# Patient Record
Sex: MALE | Race: WHITE | NOT HISPANIC OR LATINO | Employment: FULL TIME | URBAN - NONMETROPOLITAN AREA
[De-identification: names, ages, dates, MRNs, and addresses within clinical notes are randomized per-mention and may not be internally consistent; named-entity substitution may affect disease eponyms.]

---

## 2017-01-26 ENCOUNTER — HISTORY (OUTPATIENT)
Dept: FAMILY MEDICINE | Facility: OTHER | Age: 42
End: 2017-01-26

## 2017-01-26 ENCOUNTER — OFFICE VISIT - GICH (OUTPATIENT)
Dept: FAMILY MEDICINE | Facility: OTHER | Age: 42
End: 2017-01-26

## 2017-01-26 DIAGNOSIS — I10 ESSENTIAL (PRIMARY) HYPERTENSION: ICD-10-CM

## 2017-01-26 LAB
ANION GAP - HISTORICAL: 12 (ref 5–18)
BUN SERPL-MCNC: 21 MG/DL (ref 7–25)
BUN/CREAT RATIO - HISTORICAL: 19
CALCIUM SERPL-MCNC: 10 MG/DL (ref 8.6–10.3)
CHLORIDE SERPLBLD-SCNC: 99 MMOL/L (ref 98–107)
CHOL/HDL RATIO - HISTORICAL: 3.52
CHOLESTEROL TOTAL: 176 MG/DL
CO2 SERPL-SCNC: 26 MMOL/L (ref 21–31)
CREAT SERPL-MCNC: 1.13 MG/DL (ref 0.7–1.3)
GFR IF NOT AFRICAN AMERICAN - HISTORICAL: >60 ML/MIN/1.73M2
GLUCOSE SERPL-MCNC: 92 MG/DL (ref 70–105)
HDLC SERPL-MCNC: 50 MG/DL (ref 23–92)
LDLC SERPL CALC-MCNC: 111 MG/DL
NON-HDL CHOLESTEROL - HISTORICAL: 126 MG/DL
PATIENT STATUS - HISTORICAL: ABNORMAL
POTASSIUM SERPL-SCNC: 4.1 MMOL/L (ref 3.5–5.1)
SODIUM SERPL-SCNC: 137 MMOL/L (ref 133–143)
TRIGL SERPL-MCNC: 77 MG/DL

## 2017-11-20 ENCOUNTER — COMMUNICATION - GICH (OUTPATIENT)
Dept: FAMILY MEDICINE | Facility: OTHER | Age: 42
End: 2017-11-20

## 2017-11-20 DIAGNOSIS — I10 ESSENTIAL (PRIMARY) HYPERTENSION: ICD-10-CM

## 2017-12-28 NOTE — TELEPHONE ENCOUNTER
Patient Information     Patient Name MRN Sex Gabriel Garg [id number] Male 1975      Telephone Encounter by Lor Glover RN at 2017  3:32 PM     Author:  Lor Glover RN Service:  (none) Author Type:  NURS- Registered Nurse     Filed:  2017  3:38 PM Encounter Date:  2017 Status:  Signed     :  Lor Glover RN (NURS- Registered Nurse)            Redundant Refill Request refused:    Medication:hydroCHLOROthiazide (HCTZ) 25 mg tablet    Qty:90 tablet   Ref:3  Start:2017   End:              Route:Oral                  POLO:No   Class:eRx    Sig:Take 1 tablet by mouth once daily.    Pharmacy:[facility] [address] [place] [address]    Medication:lisinopril (PRINIVIL; ZESTRIL) 10 mg tablet    Qty:90 tablet   Ref:3  Start:2017   End:              Route:Oral                  POLO:No   Class:eRx    Sig:Take 1 tablet by mouth once daily.    Pharmacy:[facility] [address] [place] [address]    Unable to complete prescription refill per RN Medication Refill Policy.................... Lor Glover RN ....................  2017   3:35 PM

## 2017-12-29 ENCOUNTER — OFFICE VISIT - GICH (OUTPATIENT)
Dept: FAMILY MEDICINE | Facility: OTHER | Age: 42
End: 2017-12-29

## 2017-12-29 ENCOUNTER — HISTORY (OUTPATIENT)
Dept: FAMILY MEDICINE | Facility: OTHER | Age: 42
End: 2017-12-29

## 2017-12-29 DIAGNOSIS — Z00.00 ENCOUNTER FOR GENERAL ADULT MEDICAL EXAMINATION WITHOUT ABNORMAL FINDINGS: ICD-10-CM

## 2017-12-29 DIAGNOSIS — I10 ESSENTIAL (PRIMARY) HYPERTENSION: ICD-10-CM

## 2017-12-29 LAB
ANION GAP - HISTORICAL: 6 (ref 5–18)
BUN SERPL-MCNC: 15 MG/DL (ref 7–25)
BUN/CREAT RATIO - HISTORICAL: 15
CALCIUM SERPL-MCNC: 10 MG/DL (ref 8.6–10.3)
CHLORIDE SERPLBLD-SCNC: 102 MMOL/L (ref 98–107)
CO2 SERPL-SCNC: 29 MMOL/L (ref 21–31)
CREAT SERPL-MCNC: 0.97 MG/DL (ref 0.7–1.3)
GFR IF NOT AFRICAN AMERICAN - HISTORICAL: >60 ML/MIN/1.73M2
GLUCOSE SERPL-MCNC: 96 MG/DL (ref 70–105)
POTASSIUM SERPL-SCNC: 3.9 MMOL/L (ref 3.5–5.1)
SODIUM SERPL-SCNC: 137 MMOL/L (ref 133–143)

## 2017-12-29 ASSESSMENT — PATIENT HEALTH QUESTIONNAIRE - PHQ9: SUM OF ALL RESPONSES TO PHQ QUESTIONS 1-9: 0

## 2017-12-31 ENCOUNTER — HISTORY (OUTPATIENT)
Dept: EMERGENCY MEDICINE | Facility: OTHER | Age: 42
End: 2017-12-31

## 2018-01-03 NOTE — PROGRESS NOTES
Patient Information     Patient Name MRN Sex Gabriel Garg [id number] Male 1975      Progress Notes by Giacomo Garcia MD at 2017  7:45 AM     Author:  Giacomo Garcia MD Service:  (none) Author Type:  Physician     Filed:  2017  8:53 AM Encounter Date:  2017 Status:  Signed     :  Giacomo Garcia MD (Physician)            SUBJECTIVE:    Gabriel Saez is a 41 y.o. male who presents for blood pressure follow-up    HPI Comments: Patient arrives here for blood pressure follow-up. States that his blood pressure went fairly low and he became slightly lightheaded. He did decrease his Catapres to 1 a day. Reports some improvement. Otherwise tolerating the medications well. Is in need of a chemistry panel.      No Known Allergies and   Family History       Problem   Relation Age of Onset     Other  Mother      h/o migraines       Hypertension  Father 63     Heart Disease        CAD         REVIEW OF SYSTEMS:  ROS    OBJECTIVE:  /70  Pulse 72  Wt 96.3 kg (212 lb 3.2 oz)  BMI 27.24 kg/m2    EXAM:   Physical Exam   Constitutional: He is well-developed, well-nourished, and in no distress.   Cardiovascular: Normal rate, regular rhythm and normal heart sounds.    Pulmonary/Chest: Effort normal and breath sounds normal.       ASSESSMENT/PLAN:    ICD-10-CM    1. HTN (hypertension) I10 BASIC METABOLIC PANEL      LIPID PANEL      BASIC METABOLIC PANEL      LIPID PANEL        Plan:  Hypertension currently under good control. Recommended discontinuing his Catapres. Continue with the hydrochlorothiazide and Zestril. We'll get back to patient when his labs return.

## 2018-01-03 NOTE — NURSING NOTE
Patient Information     Patient Name MRN Sex Gabriel Garg [id number] Male 1975      Nursing Note by Maria Esther Soni at 2017  7:45 AM     Author:  Maria Esther Soni Service:  (none) Author Type:  (none)     Filed:  2017  8:11 AM Encounter Date:  2017 Status:  Signed     :  Maria Esther Soni            Patient here for follow up to blood pressure. At home range from 130 110 to 65, was too low when taking clonidine BID and he was super tired. Occasional headaches that go away with water. Maria Esther Soni LPN .......................2017  8:08 AM

## 2018-01-12 ENCOUNTER — HISTORY (OUTPATIENT)
Dept: FAMILY MEDICINE | Facility: OTHER | Age: 43
End: 2018-01-12

## 2018-01-12 ENCOUNTER — OFFICE VISIT - GICH (OUTPATIENT)
Dept: FAMILY MEDICINE | Facility: OTHER | Age: 43
End: 2018-01-12

## 2018-01-12 DIAGNOSIS — I10 ESSENTIAL (PRIMARY) HYPERTENSION: ICD-10-CM

## 2018-01-12 DIAGNOSIS — R07.89 OTHER CHEST PAIN: ICD-10-CM

## 2018-01-16 ENCOUNTER — HISTORY (OUTPATIENT)
Dept: INTERNAL MEDICINE | Facility: OTHER | Age: 43
End: 2018-01-16

## 2018-01-16 ENCOUNTER — OFFICE VISIT - GICH (OUTPATIENT)
Dept: INTERNAL MEDICINE | Facility: OTHER | Age: 43
End: 2018-01-16

## 2018-01-16 DIAGNOSIS — I10 ESSENTIAL (PRIMARY) HYPERTENSION: ICD-10-CM

## 2018-01-16 DIAGNOSIS — R07.89 OTHER CHEST PAIN: ICD-10-CM

## 2018-01-16 ASSESSMENT — PATIENT HEALTH QUESTIONNAIRE - PHQ9: SUM OF ALL RESPONSES TO PHQ QUESTIONS 1-9: 0

## 2018-01-27 VITALS — HEART RATE: 72 BPM | WEIGHT: 212.2 LBS | SYSTOLIC BLOOD PRESSURE: 110 MMHG | DIASTOLIC BLOOD PRESSURE: 70 MMHG

## 2018-02-01 ENCOUNTER — DOCUMENTATION ONLY (OUTPATIENT)
Dept: FAMILY MEDICINE | Facility: OTHER | Age: 43
End: 2018-02-01

## 2018-02-01 RX ORDER — LISINOPRIL 10 MG/1
10 TABLET ORAL DAILY
COMMUNITY
Start: 2017-12-29 | End: 2019-01-24

## 2018-02-01 RX ORDER — HYDROCHLOROTHIAZIDE 25 MG/1
25 TABLET ORAL DAILY
COMMUNITY
Start: 2017-12-29 | End: 2019-01-24

## 2018-02-07 ENCOUNTER — DOCUMENTATION ONLY (OUTPATIENT)
Dept: FAMILY MEDICINE | Facility: OTHER | Age: 43
End: 2018-02-07

## 2018-02-08 ENCOUNTER — COMMUNICATION - GICH (OUTPATIENT)
Dept: CARDIAC REHAB | Facility: OTHER | Age: 43
End: 2018-02-08

## 2018-02-09 VITALS
BODY MASS INDEX: 29.09 KG/M2 | WEIGHT: 219 LBS | HEART RATE: 80 BPM | SYSTOLIC BLOOD PRESSURE: 128 MMHG | DIASTOLIC BLOOD PRESSURE: 88 MMHG

## 2018-02-09 VITALS
HEART RATE: 76 BPM | DIASTOLIC BLOOD PRESSURE: 78 MMHG | BODY MASS INDEX: 28.93 KG/M2 | SYSTOLIC BLOOD PRESSURE: 110 MMHG | WEIGHT: 217.8 LBS

## 2018-02-09 VITALS
HEART RATE: 56 BPM | SYSTOLIC BLOOD PRESSURE: 112 MMHG | WEIGHT: 217.8 LBS | BODY MASS INDEX: 28.86 KG/M2 | DIASTOLIC BLOOD PRESSURE: 76 MMHG | HEIGHT: 73 IN

## 2018-02-10 ASSESSMENT — PATIENT HEALTH QUESTIONNAIRE - PHQ9
SUM OF ALL RESPONSES TO PHQ QUESTIONS 1-9: 0
SUM OF ALL RESPONSES TO PHQ QUESTIONS 1-9: 0

## 2018-02-12 ENCOUNTER — HOSPITAL ENCOUNTER (OUTPATIENT)
Dept: CARDIOLOGY | Facility: OTHER | Age: 43
Discharge: HOME | End: 2018-02-12
Attending: INTERNAL MEDICINE | Admitting: INTERNAL MEDICINE
Payer: COMMERCIAL

## 2018-02-12 ENCOUNTER — HOSPITAL ENCOUNTER (OUTPATIENT)
Dept: CARDIOLOGY | Facility: OTHER | Age: 43
End: 2018-02-12
Attending: INTERNAL MEDICINE
Payer: COMMERCIAL

## 2018-02-12 DIAGNOSIS — R07.89 OTHER CHEST PAIN: ICD-10-CM

## 2018-02-12 PROCEDURE — 93321 DOPPLER ECHO F-UP/LMTD STD: CPT | Mod: 26 | Performed by: INTERNAL MEDICINE

## 2018-02-12 PROCEDURE — 93018 CV STRESS TEST I&R ONLY: CPT | Performed by: INTERNAL MEDICINE

## 2018-02-12 PROCEDURE — 25500064 ZZH RX 255 OP 636: Performed by: INTERNAL MEDICINE

## 2018-02-12 PROCEDURE — 93325 DOPPLER ECHO COLOR FLOW MAPG: CPT | Mod: 26 | Performed by: INTERNAL MEDICINE

## 2018-02-12 PROCEDURE — 93017 CV STRESS TEST TRACING ONLY: CPT

## 2018-02-12 PROCEDURE — 93350 STRESS TTE ONLY: CPT | Mod: 26 | Performed by: INTERNAL MEDICINE

## 2018-02-12 PROCEDURE — 93350 STRESS TTE ONLY: CPT | Mod: TC

## 2018-02-12 PROCEDURE — 93016 CV STRESS TEST SUPVJ ONLY: CPT | Performed by: INTERNAL MEDICINE

## 2018-02-12 RX ADMIN — PERFLUTREN 3 ML: 6.52 INJECTION, SUSPENSION INTRAVENOUS at 15:36

## 2018-02-12 NOTE — PROGRESS NOTES
Definity requested by echo tech for image enhancement.  No contraindications to therapy.  Verbal consent obtained.  Definity prepped per procedure.  Definity lot #[id number] MAR.

## 2018-02-12 NOTE — PROGRESS NOTES
1400:The patient arrived for a stress echo.  The procedure, risks and benefits were discussed and the consent was signed.  The patient was prepped for the stress test, and the echo sonographer did the initial images with definity for image enhancement.    arrived, and the patient walked 11minutes and 31seconds.  The patient tolerated the procedure.  Stress images were completed and the patient was released in stable condition.  Please see the chart for complete test results.

## 2018-02-12 NOTE — NURSING NOTE
Patient Information     Patient Name MRN Sex Gabriel Garg [id number] Male 1975      Nursing Note by Maria Esther Soni at 2017  9:45 AM     Author:  Maria Esther Soni Service:  (none) Author Type:  (none)     Filed:  2017 10:02 AM Encounter Date:  2017 Status:  Signed     :  Maria Esther Soni            Patient here for blood pressure medication check, 130/80, 120/99 with home readings. He says in the last 2 months he feels off.  Maria Esther Soni LPN .......................2017  9:54 AM

## 2018-02-13 NOTE — TELEPHONE ENCOUNTER
Patient Information     Patient Name MRN Sex Gabriel Garg [id number] Male 1975      Telephone Encounter by Louisa Gomes RN at 2018  9:16 AM     Author:  Louisa Gomes RN Service:  (none) Author Type:  NURS- Registered Nurse     Filed:  2018  9:18 AM Encounter Date:  2018 Status:  Signed     :  Louisa Gomes RN (NURS- Registered Nurse)            Called to remind patient of stress test and review instructions. Aware of no caffeine for 12 hours, no food for 4 hours before, and to check in at diagnostics. No other questions.

## 2018-02-13 NOTE — NURSING NOTE
Patient Information     Patient Name MRN Sex Gabriel Garg [id number] Male 1975      Nursing Note by Maria Esther Soni at 2018  1:45 PM     Author:  Maria Esther Soni Service:  (none) Author Type:  (none)     Filed:  2018  1:56 PM Encounter Date:  2018 Status:  Signed     :  Maria Esther Soni            Patient here for ED follow up form 17 for chest pain.He says the numbness has dissapated. Maria Esther Soni LPN .......................2018  1:54 PM

## 2018-02-13 NOTE — PROGRESS NOTES
"Patient Information     Patient Name MRN Sex Gabriel Garg [id number] Male 1975      Progress Notes by Giacomo Garcia MD at 2017  9:45 AM     Author:  Giacomo Garcia MD Service:  (none) Author Type:  Physician     Filed:  1/3/2018  8:40 AM Encounter Date:  2017 Status:  Signed     :  Giacomo Garcia MD (Physician)            SUBJECTIVE:    Gabriel Saez is a 42 y.o. male who presents for follow-up blood pressure medication immunization update    HPI Comments: Patient arrives here for follow-up blood pressure medication. Immunization update. His blood pressure hasn't been under good control. He's tolerating it fairly well although states that he feels a little off at times. No specific complaints though.      No Known Allergies,   Family History       Problem   Relation Age of Onset     Other  Mother      h/o migraines       Hypertension  Father 63     Heart Disease        CAD     ,   No current outpatient prescriptions on file prior to visit.     No current facility-administered medications on file prior to visit.    ,   Social History       Substance Use Topics         Smoking status:   Never Smoker     Smokeless tobacco:   Never Used     Alcohol use   3.6 oz/week     6 Cans of beer, 0 Standard drinks or equivalent per week        Comment: 6 days a week     and   Social History       Substance Use Topics         Smoking status:   Never Smoker     Smokeless tobacco:   Never Used     Alcohol use   3.6 oz/week     6 Cans of beer, 0 Standard drinks or equivalent per week        Comment: 6 days a week        REVIEW OF SYSTEMS:  ROS    OBJECTIVE:  /76 (Cuff Site: Right Arm, Position: Sitting, Cuff Size: Adult Large)  Pulse 56  Ht 1.848 m (6' 0.75\")  Wt 98.8 kg (217 lb 12.8 oz)  BMI 28.93 kg/m2    EXAM:   Physical Exam   Constitutional: He is well-developed, well-nourished, and in no distress.   Cardiovascular: Normal rate, regular rhythm and normal heart sounds.  "     Results for orders placed or performed in visit on 12/29/17       BASIC METABOLIC PANEL       Result  Value Ref Range Status    SODIUM 137 133 - 143 mmol/L Final    POTASSIUM 3.9 3.5 - 5.1 mmol/L Final    CHLORIDE 102 98 - 107 mmol/L Final    CO2,TOTAL 29 21 - 31 mmol/L Final    ANION GAP 6 5 - 18                 Final    GLUCOSE 96 70 - 105 mg/dL Final    CALCIUM 10.0 8.6 - 10.3 mg/dL Final    BUN 15 7 - 25 mg/dL Final    CREATININE 0.97 0.70 - 1.30 mg/dL Final    BUN/CREAT RATIO           15                 Final    GFR if African American >60 >60 ml/min/1.73m2 Final    GFR if not African American >60 >60 ml/min/1.73m2 Final         ASSESSMENT/PLAN:    ICD-10-CM    1. HTN (hypertension) I10 lisinopril (PRINIVIL; ZESTRIL) 10 mg tablet      hydroCHLOROthiazide (HCTZ) 25 mg tablet      BASIC METABOLIC PANEL      FLU VACCINE => 3 YRS PF QUADRIVALENT IIV4 IM      BASIC METABOLIC PANEL   2. Preventative health care Z00.00 SC ADMIN VACC INITIAL        Plan:  Blood pressure currently under good control. Continue with the current medications. Immunizations updated.

## 2018-02-13 NOTE — PROGRESS NOTES
Patient Information     Patient Name MRN Sex Gabriel Garg [id number] Male 1975      Progress Notes by Giacomo Garcia MD at 2018  1:45 PM     Author:  Giacomo Garcia MD Service:  (none) Author Type:  Physician     Filed:  1/15/2018 12:43 PM Encounter Date:  2018 Status:  Signed     :  Giacomo Garcia MD (Physician)            SUBJECTIVE:    Gabriel Saez is a 42 y.o. male who presents for follow-up chest pain    HPI Comments: Patient arrives here for follow-up chest pain. Was recently seen in the ER and . He did rule out for a acute coronary syndrome but was advised to follow-up for a stress test. He states that the chest pain seemed to be associated with some numbness of his face and left arm. Increasing blood pressure. He's been under a lot of pressure lately with a job change. Pain not associated with activity. Patient does have a history of hypertension.      No Known Allergies,   Family History       Problem   Relation Age of Onset     Other  Mother      h/o migraines       Hypertension  Father 63     Heart Disease        CAD     ,   Current Outpatient Prescriptions on File Prior to Visit       Medication  Sig Dispense Refill     hydroCHLOROthiazide (HCTZ) 25 mg tablet Take 1 tablet by mouth once daily. 90 tablet 4     lisinopril (PRINIVIL; ZESTRIL) 10 mg tablet Take 1 tablet by mouth once daily. 90 tablet 4     No current facility-administered medications on file prior to visit.    , No past surgical history on file.,   Social History       Substance Use Topics         Smoking status:   Never Smoker     Smokeless tobacco:   Never Used     Alcohol use   3.6 oz/week     6 Cans of beer, 0 Standard drinks or equivalent per week        Comment: 6 days a week     and   Social History       Substance Use Topics         Smoking status:   Never Smoker     Smokeless tobacco:   Never Used     Alcohol use   3.6 oz/week     6 Cans of beer, 0 Standard drinks or  equivalent per week        Comment: 6 days a week        REVIEW OF SYSTEMS:  Review of Systems   Cardiovascular: Positive for chest pain.   Neurological: Positive for sensory change. Negative for dizziness, speech change, focal weakness and seizures.       OBJECTIVE:  /78 (Cuff Site: Right Arm, Position: Sitting, Cuff Size: Adult Large)  Pulse 76  Wt 98.8 kg (217 lb 12.8 oz)  BMI 27.96 kg/m2    EXAM:   Physical Exam   Constitutional: He is well-developed, well-nourished, and in no distress.   HENT:   Right Ear: External ear normal.   Left Ear: External ear normal.   Cardiovascular: Normal rate, regular rhythm and normal heart sounds.    No murmur heard.  Pulmonary/Chest: Effort normal and breath sounds normal.   Neurological: He is alert.   Psychiatric: Affect normal.       ASSESSMENT/PLAN:    ICD-10-CM    1. Other chest pain R07.89 AMB CONSULT TO INTERNAL MEDICINE   2. HTN (hypertension) I10         Plan:  Recent ER visit with chest pain. Not entirely consistent with cardiac in origin. Per ER recommendation patient would like an internal medicine consult to discuss possibly further evaluation.

## 2018-02-13 NOTE — PROGRESS NOTES
Patient Information     Patient Name MRN Sex Gabriel Garg [id number] Male 1975      Progress Notes by Jason Cabezas MD at 2018  3:40 PM     Author:  Jason Cabezas MD Service:  (none) Author Type:  Physician     Filed:  2018  4:17 PM Encounter Date:  2018 Status:  Signed     :  Jason Cabezas MD (Physician)            SUBJECTIVE:    Gabriel Saez is a 42 y.o. male who presents for consultation regarding chest pain.    HPI Comments: This patient is here for consultation at the request of Dr. Garcia. He has had an episode of chest pain that sent him to the emergency room. This happened on New Year's lore. He was working on making some chili and was going to have a beer when he just did not feel well. He just felt a strange sensation in his chest and abdomen. This slowly progressed to the point where he was having fairly significant left-sided chest pain with some numbness and even discomfort in his jaw and the side of his face. He was not particularly short of breath and there was no other radiation of the pain. He was not nauseated but just did not feel well or feel right. He did not have a fever. The discomfort persisted and was fairly significant so he ultimately went to the emergency room. He describes the pain as a heaviness or tightness throughout the chest. The pain was present for probably a couple of hours before he went to the emergency room. He had a complete evaluation there including negative Serial troponins and negative EKG. His lab was reviewed and was negative. He had a CT for pulmonary embolus, this was negative and normal as well. That was reviewed today also. He has not had any further episodes of pain like this but he is concerned regarding possible heart disease as a cause for this episode.    Cardiac risks are positive for hypertension. He does not smoke and is not obese but is slightly overweight. Recent cholesterol was acceptable. He does not have  diabetes. Family history is negative for premature atherosclerosis.    He admits that he has been under some stress at his job and suggested maybe this had something to do with the episode of pain. Medications are reconciled. Past medical history, past surgical history, family history and social histories reviewed and updated.      No Known Allergies,   Current Outpatient Prescriptions     Medication  Sig     hydroCHLOROthiazide (HCTZ) 25 mg tablet Take 1 tablet by mouth once daily.     lisinopril (PRINIVIL; ZESTRIL) 10 mg tablet Take 1 tablet by mouth once daily.     No current facility-administered medications for this visit.      Medications have been reviewed by me and are current to the best of my knowledge and ability. ,   Past Medical History:     Diagnosis  Date     Degloving injury of finger     left thumb      Femur fracture, right (HC) age 21     Fracture of right hip (HC) age 21     Hypertension      Tibia/fibula fracture age 3   ,   Patient Active Problem List      Diagnosis Date Noted     Other chest pain 01/12/2018     HTN (hypertension) 06/25/2015     OVERWEIGHT 01/04/2011   ,   Past Surgical History:      Procedure  Laterality Date     ORTHOPEDIC SURGERY Right     Femur zaira and hip pinning      and   Social History       Substance Use Topics         Smoking status:   Never Smoker     Smokeless tobacco:   Never Used     Alcohol use   3.6 oz/week      6 Cans of beer, 0 Standard drinks or equivalent per week         Comment: 6 days a week      Family Status     Relation  Status     Mother Alive     Father Alive     Other      Brother Alive     Brother Alive     Social History     Social History        Marital status:       Spouse name: N/A     Number of children:  N/A     Years of education:  N/A     Social History Main Topics         Smoking status:   Never Smoker     Smokeless tobacco:   Never Used     Alcohol use   3.6 oz/week     6 Cans of beer, 0 Standard drinks or equivalent per week         Comment: 6 days a week      Drug use:   No     Sexual activity:   Not Asked     Other Topics  Concern     None      Social History Narrative     , 2 children, works at [employer].  Lives in town.           REVIEW OF SYSTEMS:  Review of Systems   All other systems reviewed and are negative.      OBJECTIVE:  /88 (Cuff Site: Right Arm, Position: Sitting, Cuff Size: Adult Regular)  Pulse 80  Wt 99.3 kg (219 lb)  BMI 28.12 kg/m2    EXAM:   Physical Exam   Constitutional: He is well-developed, well-nourished, and in no distress. No distress.   Eyes: Pupils are equal, round, and reactive to light.   Neck: Normal range of motion. Neck supple. Normal carotid pulses and no JVD present. Carotid bruit is not present. No tracheal deviation present. No thyromegaly present.   Cardiovascular: Normal rate, regular rhythm and normal heart sounds.  Exam reveals no gallop and no friction rub.    No murmur heard.  Pulmonary/Chest: Effort normal and breath sounds normal. No respiratory distress. He has no wheezes. He has no rales.   Abdominal: Soft. Bowel sounds are normal. He exhibits no distension and no mass. There is no tenderness. There is no rebound and no guarding.   Musculoskeletal: He exhibits no edema.   Lymphadenopathy:     He has no cervical adenopathy.   Neurological: He is alert.   Skin: Skin is warm and dry. He is not diaphoretic.   Psychiatric: Affect normal.   Nursing note and vitals reviewed.      ASSESSMENT/PLAN:    ICD-10-CM    1. HTN (hypertension) I10    2. Other chest pain R07.89 AMB CONSULT TO INTERNAL MEDICINE      STRESS TEST EXERCISE DONE WITH STRESS ECHO      ECHO STRESS W CONTRAST        Plan:  I spent some time today reviewing his studies with him including his CT scan of the chest for pulmonary embolism. This was entirely normal including no non-PE related disease. His chest pain syndrome seems atypical for occult coronary artery disease but given his age and his history of  hypertension, it seems reasonable to rule this out to complete the evaluation. This was discussed with the patient and he is in favor of doing stress testing. I will get him scheduled for a stress echo in the near future for further evaluation. I think that if the stress test is negative, no further evaluation would be necessary unless he has recurrent problems.

## 2018-02-14 RX ADMIN — PERFLUTREN 3 ML: 6.52 INJECTION, SUSPENSION INTRAVENOUS at 11:48

## 2018-02-15 NOTE — ADDENDUM NOTE
Encounter addended by: Betty Valle on: 2/15/2018  8:50 AM<BR>     Actions taken: Charge Capture section accepted

## 2018-07-23 NOTE — PROGRESS NOTES
Patient Information     Patient Name  Gabriel Saez MRN  [id number] Sex  Male   1975      Letter by Giacomo Garcia MD at      Author:  Giacomo Garcia MD Service:  (none) Author Type:  (none)    Filed:   Encounter Date:  2017 Status:  (Other)           Gabriel Saez  [address] [facility], [facility] [address]          2017    Dear Mr. Saez:    Enclosed is a copy of your cholesterol and kidney function indicating good cholesterol control. I would recommend continuing the medications as prescribed. Please call if you should have any questions.  Results for orders placed or performed in visit on 17       BASIC METABOLIC PANEL       Result  Value Ref Range Status    SODIUM 137 133 - 143 mmol/L Final    POTASSIUM 4.1 3.5 - 5.1 mmol/L Final    CHLORIDE 99 98 - 107 mmol/L Final    CO2,TOTAL 26 21 - 31 mmol/L Final    ANION GAP 12 5 - 18                 Final    GLUCOSE 92 70 - 105 mg/dL Final    CALCIUM 10.0 8.6 - 10.3 mg/dL Final    BUN 21 7 - 25 mg/dL Final    CREATININE 1.13 0.70 - 1.30 mg/dL Final    BUN/CREAT RATIO           19                 Final    GFR if African American >60 >60 ml/min/1.73m2 Final    GFR if not African American >60 >60 ml/min/1.73m2 Final   LIPID PANEL       Result  Value Ref Range Status    CHOLESTEROL,TOTAL 176 <200 mg/dL Final    TRIGLYCERIDES 77 <150 mg/dL Final    HDL CHOLESTEROL 50 23 - 92 mg/dL Final    NON-HDL CHOLESTEROL 126 <145 mg/dl Final    CHOL/HDL RATIO            3.52 <4.50                 Final    LDL CHOLESTEROL 111 (H) <100 mg/dL Final    PATIENT STATUS            NOT GIVEN                 Final     Giacomo Garcia MD ....................  2017   11:08 AM

## 2018-07-23 NOTE — PROGRESS NOTES
Patient Information     Patient Name  Gabriel Saez MRN  [id number] Sex  Male   1975      Letter by Giacomo Garcia MD at      Author:  Giacomo Garcia MD Service:  (none) Author Type:  (none)    Filed:   Encounter Date:  2017 Status:  (Other)           Gabriel Saez  [address] [facility] [address]          January 3, 2018    Dear Mr. Saez:    Enclosed are copies of your laboratory testing which did come back satisfactory. Please call if you should have any questions.  Results for orders placed or performed in visit on 17       BASIC METABOLIC PANEL       Result  Value Ref Range Status    SODIUM 137 133 - 143 mmol/L Final    POTASSIUM 3.9 3.5 - 5.1 mmol/L Final    CHLORIDE 102 98 - 107 mmol/L Final    CO2,TOTAL 29 21 - 31 mmol/L Final    ANION GAP 6 5 - 18                 Final    GLUCOSE 96 70 - 105 mg/dL Final    CALCIUM 10.0 8.6 - 10.3 mg/dL Final    BUN 15 7 - 25 mg/dL Final    CREATININE 0.97 0.70 - 1.30 mg/dL Final    BUN/CREAT RATIO           15                 Final    GFR if African American >60 >60 ml/min/1.73m2 Final    GFR if not African American >60 >60 ml/min/1.73m2 Final     Giacomo Garcia MD ....................  1/3/2018   1:40 PM

## 2018-08-31 ENCOUNTER — OFFICE VISIT (OUTPATIENT)
Dept: FAMILY MEDICINE | Facility: OTHER | Age: 43
End: 2018-08-31
Attending: NURSE PRACTITIONER
Payer: COMMERCIAL

## 2018-08-31 ENCOUNTER — TELEPHONE (OUTPATIENT)
Dept: FAMILY MEDICINE | Facility: OTHER | Age: 43
End: 2018-08-31

## 2018-08-31 VITALS
DIASTOLIC BLOOD PRESSURE: 88 MMHG | WEIGHT: 218.13 LBS | TEMPERATURE: 97.2 F | HEART RATE: 88 BPM | BODY MASS INDEX: 28.98 KG/M2 | SYSTOLIC BLOOD PRESSURE: 122 MMHG

## 2018-08-31 DIAGNOSIS — R10.32 LLQ ABDOMINAL PAIN: Primary | ICD-10-CM

## 2018-08-31 PROCEDURE — 99213 OFFICE O/P EST LOW 20 MIN: CPT | Performed by: NURSE PRACTITIONER

## 2018-08-31 ASSESSMENT — PAIN SCALES - GENERAL: PAINLEVEL: NO PAIN (1)

## 2018-08-31 NOTE — PROGRESS NOTES
HPI:    Gabriel Saez is a 42 year old male who presents to clinic today for abdominal pain. He is having left lower quadrant pain. States is has been going on for the past 3 years. When he has the pain it is sharp and rates 2-3/10. When he has pain drinking water and deep breaths make this better. Most foods don't make this worse. Spicy foods seem to make this worse. Increased alcohol use will worsen this. Reports episodes of pain as often as every month to 3 moths or more. No constipation. Will have some diarrhea at times and then the sx resolve. He has had labs in the past, nothing significant. He reports had cardiac work up and negative. Has tried some OTC acid reducers with some mild relief of sx.     Past Medical History:   Diagnosis Date     Closed fracture of neck of right femur (H)     age 21     Closed fracture of right femur (H)     age 21     Closed fracture of shaft of fibula     age 3     Essential (primary) hypertension     No Comments Provided     Open wound of finger without damage to nail     left thumb       Social History     Social History     Marital status:      Spouse name: N/A     Number of children: N/A     Years of education: N/A     Occupational History     Not on file.     Social History Main Topics     Smoking status: Never Smoker     Smokeless tobacco: Never Used     Alcohol use 3.6 oz/week      Comment: Alcoholic Drinks/day: 6 days a week     Drug use: No      Comment: Drug use: No     Sexual activity: Not on file     Other Topics Concern     Not on file     Social History Narrative    , 2 children, works at [employer].  Lives in town.       Current Outpatient Prescriptions   Medication Sig Dispense Refill     hydrochlorothiazide (HYDRODIURIL) 25 MG tablet Take 25 mg by mouth daily       lisinopril (PRINIVIL/ZESTRIL) 10 MG tablet Take 10 mg by mouth daily         No Known Allergies    ROS:  Pertinent positives and negatives are noted in HPI.    EXAM:  General  appearance: well appearing male, in no acute distress  Respiratory: clear to auscultation bilaterally  Cardiac: RRR with no murmurs  Abdomen: soft, nontender, no masses or organomegally, normal BS  Psychological: normal affect, alert and pleasant    ASSESSMENT AND PLAN:    1. LLQ abdominal pain      Nonspecific LLQ abdominal pain that has been intermittent for a long time. No current sx, exam is stable. Discussed with PCP. Will order hemoccults and f/u with results as needed. Discussed sx that would warrant f/u such has bloody stools, fevers or worsening pain. F/u prn.        Yee Schmid..................8/31/2018 2:47 PM

## 2018-08-31 NOTE — TELEPHONE ENCOUNTER
Left message to come early 12:45 to appt. Maria Esther Soni LPN .......................8/31/2018  9:18 AM

## 2018-08-31 NOTE — MR AVS SNAPSHOT
"              After Visit Summary   2018    Gabriel Saez    MRN: [id number]           Patient Information     Date Of Birth          1975        Visit Information        Provider Department      2018 3:00 PM Yee Schmid APRN CNP [facility]        Today's Diagnoses     LLQ abdominal pain    -  1       Follow-ups after your visit        Who to contact     If you have questions or need follow up information about today's clinic visit or your schedule please contact [facility] directly at [phone].  Normal or non-critical lab and imaging results will be communicated to you by Stakeforce[employer], letter or phone within 4 business days after the clinic has received the results. If you do not hear from us within 7 days, please contact the clinic through Yikuaiqut or phone. If you have a critical or abnormal lab result, we will notify you by phone as soon as possible.  Submit refill requests through [employer] or call your pharmacy and they will forward the refill request to us. Please allow 3 business days for your refill to be completed.          Additional Information About Your Visit        MyChart Information     [employer] lets you send messages to your doctor, view your test results, renew your prescriptions, schedule appointments and more. To sign up, go to [email or web address] . Click on \"Log in\" on the left side of the screen, which will take you to the Welcome page. Then click on \"Sign up Now\" on the right side of the page.     You will be asked to enter the access code listed below, as well as some personal information. Please follow the directions to create your username and password.     Your access code is: [email or web address]  Expires: 2018  1:42 PM     Your access code will  in 90 days. If you need help or a new code, please call your [facility] clinic or [phone].        Care EveryWhere ID     This is your Care EveryWhere ID. This could be " used by other organizations to access your [facility] medical records  [id number]        Your Vitals Were     Pulse Temperature BMI (Body Mass Index)             88 97.2  F (36.2  C) (Tympanic) 28.98 kg/m2          Blood Pressure from Last 3 Encounters:   08/31/18 122/88   01/16/18 128/88   01/12/18 110/78    Weight from Last 3 Encounters:   08/31/18 218 lb 2 oz (98.9 kg)   01/16/18 219 lb (99.3 kg)   01/12/18 217 lb 12.8 oz (98.8 kg)               Primary Care Provider Office Phone # Fax #    Giacomo Garcia [phone]       [email or web address] [facility] [address]        Equal Access to Services     EM CABRAL : Ghazal douglass Sonichelle, waaxrancho luqadaha, qaybta kaalmada adeeliazaryarancho, tomy bermudez . So [facility] [phone].    ATENCIÓN: Si habla español, tiene a alaniz disposición servicios gratuitos de asistencia lingüística. Llame al [phone].    We comply with applicable federal civil rights laws and [place] laws. We do not discriminate on the basis of race, color, national origin, age, disability, sex, sexual orientation, or gender identity.            Thank you!     Thank you for choosing [facility] AND [facility]  for your care. Our goal is always to provide you with excellent care. Hearing back from our patients is one way we can continue to improve our services. Please take a few minutes to complete the written survey that you may receive in the mail after your visit with us. Thank you!             Your Updated Medication List - Protect others around you: Learn how to safely use, store and throw away your medicines at www.disposemymeds.org.          This list is accurate as of 8/31/18 11:59 PM.  Always use your most recent med list.                   Brand Name Dispense Instructions for use Diagnosis    hydrochlorothiazide 25 MG tablet    HYDRODIURIL     Take 25 mg by mouth daily        lisinopril 10 MG tablet    PRINIVIL/ZESTRIL     Take 10 mg by  mouth daily

## 2018-08-31 NOTE — NURSING NOTE
Patient presents to clinic today for left lower quadrant pain. He states he feels it has been going on for a few years. He states when it happens it is sharp and rates pain about 2-3/10. He states there aren't many foods that make it worse. He states water and deep breaths improve discomfort. He states he can always notice a discomfort.    Domonique Dunne LPN...................8/31/2018  2:50 PM

## 2018-08-31 NOTE — TELEPHONE ENCOUNTER
Spoke with patient and asked if she was able to come in earlier than 3pm. He states he will head this way in about 15 mins.  Domonique Dunne LPN...................8/31/2018  2:11 PM

## 2018-09-04 PROBLEM — R07.89 OTHER CHEST PAIN: Status: ACTIVE | Noted: 2018-01-12

## 2018-10-23 ENCOUNTER — OFFICE VISIT (OUTPATIENT)
Dept: INTERNAL MEDICINE | Facility: OTHER | Age: 43
End: 2018-10-23
Attending: FAMILY MEDICINE
Payer: COMMERCIAL

## 2018-10-23 VITALS — SYSTOLIC BLOOD PRESSURE: 122 MMHG | HEART RATE: 76 BPM | DIASTOLIC BLOOD PRESSURE: 78 MMHG

## 2018-10-23 DIAGNOSIS — R10.84 ABDOMINAL PAIN, GENERALIZED: ICD-10-CM

## 2018-10-23 DIAGNOSIS — I10 ESSENTIAL HYPERTENSION: Primary | ICD-10-CM

## 2018-10-23 LAB
ANION GAP SERPL CALCULATED.3IONS-SCNC: 9 MMOL/L (ref 3–14)
BUN SERPL-MCNC: 20 MG/DL (ref 7–25)
CALCIUM SERPL-MCNC: 9.7 MG/DL (ref 8.6–10.3)
CHLORIDE SERPL-SCNC: 101 MMOL/L (ref 98–107)
CO2 SERPL-SCNC: 29 MMOL/L (ref 21–31)
CREAT SERPL-MCNC: 1.45 MG/DL (ref 0.7–1.3)
GFR SERPL CREATININE-BSD FRML MDRD: 53 ML/MIN/1.7M2
GLUCOSE SERPL-MCNC: 99 MG/DL (ref 70–105)
POTASSIUM SERPL-SCNC: 3.7 MMOL/L (ref 3.5–5.1)
SODIUM SERPL-SCNC: 139 MMOL/L (ref 134–144)

## 2018-10-23 PROCEDURE — 99214 OFFICE O/P EST MOD 30 MIN: CPT | Performed by: INTERNAL MEDICINE

## 2018-10-23 PROCEDURE — 80048 BASIC METABOLIC PNL TOTAL CA: CPT | Performed by: INTERNAL MEDICINE

## 2018-10-23 PROCEDURE — 36415 COLL VENOUS BLD VENIPUNCTURE: CPT | Performed by: INTERNAL MEDICINE

## 2018-10-23 ASSESSMENT — ENCOUNTER SYMPTOMS
CONSTITUTIONAL NEGATIVE: 1
ENDOCRINE NEGATIVE: 1
ALLERGIC/IMMUNOLOGIC NEGATIVE: 1
HEMATOLOGIC/LYMPHATIC NEGATIVE: 1

## 2018-10-23 NOTE — MR AVS SNAPSHOT
After Visit Summary   10/23/2018    Gabriel Saez    MRN: [id number]           Patient Information     Date Of Birth          1975        Visit Information        Provider Department      10/23/2018 2:20 PM Jason Cabezas MD [facility]        Today's Diagnoses     Essential hypertension    -  1    Abdominal pain, generalized           Follow-ups after your visit        Your next 10 appointments already scheduled     Oct 26, 2018  7:30 AM CDT   (Arrive by 7:15 AM)   CT ABDOMEN PELVIS W CONTRAST with GHCT1, GHCTPREP   [facility]    [address]  [place] [address]   [phone]           How do I prepare for my exam? (Food and drink instructions) To prepare: Do not eat or drink for 2 hours before your exam. If you need to take medicine, you may take it with small sips of water. (We may ask you to take liquid medicine as well.)  How do I prepare for my exam? (Other instructions) Please arrive 30 minutes early for your CT.  Once in the department you might be asked to drink water 15-20 minutes prior to your exam.  If indicated you may be asked to drink an oral contrast in advance of your CT.  If this is the case, the imaging team will let you know or be in contact with you prior to your appointment  Patients over 70 or patients with diabetes or kidney problems: If you haven t had a blood test (creatinine test) within the last 30 days, the Cardiologist/Radiologist may require you to get this test prior to your exam.  If you have diabetes:  Continue to take your metformin medication on the day of your exam  What should I wear: Please wear loose clothing, such as a sweat suit or jogging clothes. Avoid snaps, zippers and other metal. We may ask you to undress and put on a hospital gown.  How long does the exam take: Most scans take less than 20 minutes.  What should I bring: Please bring any scans or  X-rays taken at other hospitals, if similar tests were done. Also bring a list of your medicines, including vitamins, minerals and over-the-counter drugs. It is safest to leave personal items at home.  Do I need a : No  is needed.  What do I need to tell my doctor? Be sure to tell your doctor: * If you have any allergies. * If there s any chance you are pregnant. * If you are breastfeeding.  What should I do after the exam: No restrictions, You may resume normal activities.  What is this test: A CT (computed tomography) scan is a series of pictures that allows us to look inside your body. The scanner creates images of the body in cross sections, much like slices of bread. This helps us see any problems more clearly. You may receive contrast (X-ray dye) before or during your scan. You will be asked to drink the contrast.  Who should I call with questions: If you have any questions, please call the Imaging Department where you will have your exam. Directions, parking instructions, and other information is available on our website, [email or web address].              Future tests that were ordered for you today     Open Future Orders        Priority Expected Expires Ordered    CT Abdomen Pelvis w Contrast Routine  10/23/2019 10/23/2018    Metanephrine random or 24 hr urine Routine  10/23/2019 10/23/2018    Catecholamines fractioned free urine Routine  1/21/2019 10/23/2018    5-HIAA quantitative urine Routine  10/24/2019 10/23/2018            Who to contact     If you have questions or need follow up information about today's clinic visit or your schedule please contact [facility] directly at [phone].  Normal or non-critical lab and imaging results will be communicated to you by MyChart, letter or phone within 4 business days after the clinic has received the results. If you do not hear from us within 7 days, please contact the clinic through MyChart or phone. If you have a critical  or abnormal lab result, we will notify you by phone as soon as possible.  Submit refill requests through [employer] or call your pharmacy and they will forward the refill request to us. Please allow 3 business days for your refill to be completed.          Additional Information About Your Visit        Care EveryWhere ID     This is your Care EveryWhere ID. This could be used by other organizations to access your [facility] medical records  [id number]        Your Vitals Were     Pulse                   76            Blood Pressure from Last 3 Encounters:   10/23/18 122/78   08/31/18 122/88   01/16/18 128/88    Weight from Last 3 Encounters:   08/31/18 218 lb 2 oz (98.9 kg)   01/16/18 219 lb (99.3 kg)   01/12/18 217 lb 12.8 oz (98.8 kg)              We Performed the Following     Basic Metabolic Panel        Primary Care Provider Office Phone # Fax #    Giacomo Garcia [phone]       [address] [facility] [address]        Equal Access to Services     [facility]: Hadii aad ku hadasho Soomaali, waaxda luqadaha, qaybta kaalmada adeegyada, waxay abdulkadirin haynilon lexi bermudez . So [facility] [phone].    ATENCIÓN: Si habla español, tiene a alaniz disposición servicios gratuitos de asistencia lingüística. [facility] [phone].    We comply with applicable federal civil rights laws and [place] laws. We do not discriminate on the basis of race, color, national origin, age, disability, sex, sexual orientation, or gender identity.            Thank you!     Thank you for choosing [facility] AND [facility]  for your care. Our goal is always to provide you with excellent care. Hearing back from our patients is one way we can continue to improve our services. Please take a few minutes to complete the written survey that you may receive in the mail after your visit with us. Thank you!             Your Updated Medication List - Protect others around you: Learn how to safely use, store and  throw away your medicines at www.disposemymeds.org.          This list is accurate as of 10/23/18  3:20 PM.  Always use your most recent med list.                   Brand Name Dispense Instructions for use Diagnosis    hydrochlorothiazide 25 MG tablet    HYDRODIURIL     Take 25 mg by mouth daily        lisinopril 10 MG tablet    PRINIVIL/ZESTRIL     Take 10 mg by mouth daily

## 2018-10-23 NOTE — NURSING NOTE
"The patient is here today to have a follow up on his blood pressures.  La Romero LPN on 10/23/2018 at 2:28 PM  Chief Complaint   Patient presents with     RECHECK       Initial /78 (BP Location: Right arm, Patient Position: Sitting, Cuff Size: Adult Large)  Pulse 76 Estimated body mass index is 28.98 kg/(m^2) as calculated from the following:    Height as of 12/29/17: 6' 0.75\" (1.848 m).    Weight as of 8/31/18: 218 lb 2 oz (98.9 kg).  Medication Reconciliation: complete    La Romero LPN    "

## 2018-10-23 NOTE — LETTER
October 24, 2018      Gabriel Saez  [address] [address]        Dear Gabriel Saez,    Below are the results of your recent labs:    Results for orders placed or performed in visit on 10/23/18   Basic Metabolic Panel   Result Value Ref Range    Sodium 139 134 - 144 mmol/L    Potassium 3.7 3.5 - 5.1 mmol/L    Chloride 101 98 - 107 mmol/L    Carbon Dioxide 29 21 - 31 mmol/L    Anion Gap 9 3 - 14 mmol/L    Glucose 99 70 - 105 mg/dL    Urea Nitrogen 20 7 - 25 mg/dL    Creatinine 1.45 (H) 0.70 - 1.30 mg/dL    GFR Estimate 53 (L) >60 mL/min/1.7m2    GFR Estimate If Black 65 >60 mL/min/1.7m2    Calcium 9.7 8.6 - 10.3 mg/dL        Your creatinine which is a kidney test has risen somewhat meaning that your kidneys are a little bit weaker.  We will need to do a follow-up on this in the next month or 2 but we will wait to see what the rest of your tests show first.  If you have any questions in the interim, feel free to contact me.    Sincerely,        Jason Cabezas MD  Internal Medicine  [facility]
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
## 2018-10-24 ASSESSMENT — PATIENT HEALTH QUESTIONNAIRE - PHQ9: SUM OF ALL RESPONSES TO PHQ QUESTIONS 1-9: 0

## 2018-10-26 ENCOUNTER — HOSPITAL ENCOUNTER (OUTPATIENT)
Dept: CT IMAGING | Facility: OTHER | Age: 43
Discharge: HOME | End: 2018-10-26
Attending: INTERNAL MEDICINE | Admitting: INTERNAL MEDICINE
Payer: COMMERCIAL

## 2018-10-26 DIAGNOSIS — R10.84 ABDOMINAL PAIN, GENERALIZED: ICD-10-CM

## 2018-10-26 PROCEDURE — 25500064 ZZH RX 255 OP 636: Performed by: INTERNAL MEDICINE

## 2018-10-26 PROCEDURE — 74177 CT ABD & PELVIS W/CONTRAST: CPT

## 2018-10-26 RX ORDER — IODIXANOL 320 MG/ML
100 INJECTION, SOLUTION INTRAVASCULAR ONCE
Status: COMPLETED | OUTPATIENT
Start: 2018-10-26 | End: 2018-10-26

## 2018-10-26 RX ADMIN — IODIXANOL 100 ML: 320 INJECTION, SOLUTION INTRAVASCULAR at 09:42

## 2018-10-26 NOTE — LETTER
November 5, 2018        Gabriel Saez  [address] [address]        Dear Gabriel Saez,    Your CT scan of the abdomen has returned and is basically normal.  You do have a very small hiatal hernia but no other abnormalities of concern.  I will let you know the results of your urine tests when they return.    Sincerely,        Jason Cabezas MD  Internal Medicine  [facility]

## 2018-10-28 DIAGNOSIS — I10 ESSENTIAL HYPERTENSION: ICD-10-CM

## 2018-10-28 PROCEDURE — 83497 ASSAY OF 5-HIAA: CPT | Performed by: INTERNAL MEDICINE

## 2018-10-28 PROCEDURE — 83835 ASSAY OF METANEPHRINES: CPT | Performed by: INTERNAL MEDICINE

## 2018-10-28 PROCEDURE — 82384 ASSAY THREE CATECHOLAMINES: CPT | Performed by: INTERNAL MEDICINE

## 2018-11-02 LAB
CATECHOLS UR-IMP: NORMAL
COLLECT DURATION TIME SPEC: 24 H
CREAT 24H UR-MCNC: 68 MG/DL
CREAT 24H UR-MRATE: NORMAL MG/D (ref 1000–2500)
DOPAMINE 24H UR-MRATE: NORMAL UG/D (ref 77–324)
DOPAMINE UR-MCNC: 1 UG/L
DOPAMINE/CREAT UR: 135 UG/G CRT (ref 0–250)
EPINEPH 24H UR-MRATE: NORMAL UG/D (ref 1–7)
EPINEPH UR-MCNC: 92 UG/L
EPINEPH/CREAT UR: 1 UG/G CRT (ref 0–20)
NOREPINEPH 24H UR-MRATE: NORMAL UG/D (ref 16–71)
NOREPINEPH UR-MCNC: 13 UG/L
NOREPINEPH/CREAT UR: 19 UG/G CRT (ref 0–45)
SPECIMEN VOL ?TM UR: 3 ML

## 2018-11-03 LAB
COLLECT DURATION TIME SPEC: 24 H
CREAT 24H UR-MCNC: 70 MG/DL
CREAT 24H UR-MRATE: NORMAL MG/D (ref 1000–2500)
METANEPH 24H UR-MCNC: 34 UG/L
METANEPH 24H UR-MRATE: NORMAL UG/D (ref 62–207)
METANEPH+NORMETANEPH UR-IMP: NORMAL
METANEPH/CREAT 24H UR: 49 UG/G CRT (ref 0–300)
NORMETANEPHRINE 24H UR-MCNC: 86 UG/L
NORMETANEPHRINE 24H UR-MRATE: NORMAL UG/D (ref 125–510)
NORMETANEPHRINE/CREAT 24H UR: 123 UG/G CRT (ref 0–400)
SPECIMEN VOL ?TM UR: 3 ML

## 2018-11-05 LAB
5HIAA & CREATININE UR-IMP: NORMAL
5OH-INDOLEACETATE 24H UR-MCNC: 2.5 MG/L
5OH-INDOLEACETATE 24H UR-MRATE: NORMAL MG/D (ref 0–15)
5OH-INDOLEACETATE/CREAT 24H UR: 4 MG/GCR (ref 0–14)
COLLECT DURATION TIME UR: 24 HR
CREAT 24H UR-MRATE: NORMAL MG/D (ref 1000–2500)
CREAT SERPL-MCNC: 69 MG/DL
SPECIMEN VOL ?TM UR: 3 ML

## 2019-01-24 DIAGNOSIS — I10 ESSENTIAL HYPERTENSION: Primary | ICD-10-CM

## 2019-01-25 RX ORDER — HYDROCHLOROTHIAZIDE 25 MG/1
TABLET ORAL
Qty: 90 TABLET | Refills: 2 | Status: SHIPPED | OUTPATIENT
Start: 2019-01-25 | End: 2019-11-01

## 2019-01-25 RX ORDER — LISINOPRIL 10 MG/1
TABLET ORAL
Qty: 90 TABLET | Refills: 2 | Status: SHIPPED | OUTPATIENT
Start: 2019-01-25 | End: 2019-11-01

## 2019-01-25 NOTE — TELEPHONE ENCOUNTER
Routing refill request to provider for review/approval because:  Labs out of range:  Creatinine 1.45 on 10-23-18    LOV 10-24-18, hypertension consultation with Dr Cabezas.  Not clear if further follow-up was recommended after last labs.  Lauryn Tate RN on 1/25/2019 at 8:23 AM

## 2019-05-23 ENCOUNTER — TRANSFERRED RECORDS (OUTPATIENT)
Dept: HEALTH INFORMATION MANAGEMENT | Facility: OTHER | Age: 44
End: 2019-05-23

## 2019-05-23 ENCOUNTER — OFFICE VISIT (OUTPATIENT)
Dept: FAMILY MEDICINE | Facility: OTHER | Age: 44
End: 2019-05-23
Attending: FAMILY MEDICINE
Payer: COMMERCIAL

## 2019-05-23 VITALS
BODY MASS INDEX: 28.23 KG/M2 | DIASTOLIC BLOOD PRESSURE: 70 MMHG | HEART RATE: 60 BPM | WEIGHT: 220 LBS | SYSTOLIC BLOOD PRESSURE: 110 MMHG | RESPIRATION RATE: 12 BRPM | TEMPERATURE: 98.4 F | HEIGHT: 74 IN

## 2019-05-23 DIAGNOSIS — H57.02 PUPIL ASYMMETRY: Primary | ICD-10-CM

## 2019-05-23 PROCEDURE — 99213 OFFICE O/P EST LOW 20 MIN: CPT | Performed by: FAMILY MEDICINE

## 2019-05-23 ASSESSMENT — PATIENT HEALTH QUESTIONNAIRE - PHQ9: SUM OF ALL RESPONSES TO PHQ QUESTIONS 1-9: 0

## 2019-05-23 ASSESSMENT — MIFFLIN-ST. JEOR: SCORE: 1962.66

## 2019-05-23 ASSESSMENT — PAIN SCALES - GENERAL: PAINLEVEL: NO PAIN (1)

## 2019-05-23 NOTE — NURSING NOTE
Patient here for Horners Syndrome  Discussion. He was at the eye doctor today and noticed pupils are different size. Medication Reconciliation: complete.    Maria Esther Soni LPN  5/23/2019 2:40 PM

## 2019-05-24 PROBLEM — R07.89 OTHER CHEST PAIN: Status: RESOLVED | Noted: 2018-01-12 | Resolved: 2019-05-24

## 2019-05-24 NOTE — PROGRESS NOTES
"  SUBJECTIVE:   Gabriel Saez is a 43 year old male who presents to clinic today for the following health issues: Estela's evaluation    Patient was recently at [facility] getting correction to his glasses when he was advised by his optometrist that his pupils have different sizes.  She stated that he should see an MD to rule out Estela syndrome.        Patient Active Problem List    Diagnosis Date Noted     HTN (hypertension) 06/25/2015     Priority: Medium     Overweight 01/04/2011     Priority: Medium     Past Medical History:   Diagnosis Date     Closed fracture of right femur (H)     age 21     Closed fracture of shaft of fibula     age 3     Essential (primary) hypertension     No Comments Provided        Review of Systems     OBJECTIVE:     /70   Pulse 60   Temp 98.4  F (36.9  C)   Resp 12   Ht 1.88 m (6' 2\")   Wt 99.8 kg (220 lb)   BMI 28.25 kg/m    Body mass index is 28.25 kg/m .  Physical Exam   Constitutional: He appears well-developed.   HENT:   Head: Normocephalic.   Right Ear: External ear normal.   Skin:   On my exam pupils appear roughly equal.  During a dark setting both pupils dilated equally.  The right eye initially appears to be slightly lower than the left but the lid is more edematous and on closer inspection it looks to be about the same.           ASSESSMENT/PLAN:         1. Pupil asymmetry  Per optometry at [facility].  I discussed with the patient signs and symptoms of Estela syndrome.  I doubt patient has it.  He would like a second opinion with ophthalmology.   He will make an appointment and if he has any trouble call      Giacomo Garcia MD  [facility]  "

## 2019-11-01 DIAGNOSIS — I10 ESSENTIAL HYPERTENSION: Primary | ICD-10-CM

## 2019-11-05 RX ORDER — HYDROCHLOROTHIAZIDE 25 MG/1
TABLET ORAL
Qty: 90 TABLET | Refills: 0 | Status: SHIPPED | OUTPATIENT
Start: 2019-11-05 | End: 2020-02-11

## 2019-11-05 RX ORDER — LISINOPRIL 10 MG/1
TABLET ORAL
Qty: 90 TABLET | Refills: 0 | Status: SHIPPED | OUTPATIENT
Start: 2019-11-05 | End: 2020-02-11

## 2019-11-05 NOTE — TELEPHONE ENCOUNTER
Refilled, but due for lab monitoring with medications. Should make appointment with Dr Garcia. Please let him know.

## 2019-11-05 NOTE — TELEPHONE ENCOUNTER
[facility] #[id number] sent Rx request for the following:    From pharmacy: Patient enrolled in our Rx Med Sync service to improveadherence. We are requesting a refill authorization inadvance to ensure an active prescription is on file.     HYDROCHLOROTHIAZIDE 25MG TAB  Sig: TAKE 1 TABLET BY MOUTH ONCE DAILY.  Last Prescription Date:   1/25/19  Last Fill Qty/Refills:         90, R-2    Routing refill request to provider for review/approval because:  Diuretics (Including Combos) Protocol Mdxsqd43/5 11:01 AM   Normal serum creatinine on file in past 12 months    Normal serum potassium on file in past 12 months    Normal serum sodium on file in past 12 months     LISINOPRIL 10MG TABLET  Sig: TAKE 1 TABLET BY MOUTH ONCE DAILY.  Last Prescription Date:   1/25/19  Last Fill Qty/Refills:         90, R-2    Routing refill request to provider for review/approval because:  ACE Inhibitors (Including Combos) Protocol Itaqpq05/5 11:01 AM   Normal serum creatinine on file in past 12 months    Normal serum potassium on file in past 12 months     Last Office Visit:                  5/23/19 (Pupil asymmetry)    Last comprehensive visit: 12/29/17  Future Office visit:           None.    In clinical absence of patient's primary, Giacomo Garcia, patient is requesting that this message be sent to the primary provider's Teamlet for consideration please. Dr. Garcia is out of the clinic, the rest of the week.    Unable to complete prescription refill per RN Medication Refill Policy. Lauryn Sanchez RN .............. 11/5/2019  11:08 AM

## 2020-02-10 DIAGNOSIS — I10 ESSENTIAL HYPERTENSION: Primary | ICD-10-CM

## 2020-02-11 RX ORDER — HYDROCHLOROTHIAZIDE 25 MG/1
TABLET ORAL
Qty: 30 TABLET | Refills: 0 | Status: SHIPPED | OUTPATIENT
Start: 2020-02-11 | End: 2020-02-17

## 2020-02-11 RX ORDER — LISINOPRIL 10 MG/1
TABLET ORAL
Qty: 30 TABLET | Refills: 0 | Status: SHIPPED | OUTPATIENT
Start: 2020-02-11 | End: 2020-02-17

## 2020-02-11 NOTE — TELEPHONE ENCOUNTER
[facility] #[id number] sent Rx request for the following:    From pharmacy: Patient enrolled in our Rx Med Sync service to improveadherence. We are requesting a refill authorization inadvance to ensure an active prescription is on file.     LISINOPRIL 10MG TABLET  Sig: TAKE 1 TABLET BY MOUTH ONCE DAILY. NEEDS APPT FOR REFILLS.  Last Prescription Date:   11/5/19  Last Fill Qty/Refills:         90, R-0    Notes to Pharmacy: Due for appointment  ACE Inhibitors (Including Combos) Protocol Failed2/11 9:22 AM   Normal serum creatinine on file in past 12 months    Normal serum potassium on file in past 12 months     HYDROCHLOROTHIAZIDE 25MG TAB  Sig: TAKE 1 TABLET BY MOUTH ONCE DAILY. NEEDS APPT FOR REFILLS.  Last Prescription Date:   11/5/19  Last Fill Qty/Refills:         90, R-0    Notes to Pharmacy: Due for appointment  Diuretics (Including Combos) Protocol Failed2/11 9:22 AM   Normal serum creatinine on file in past 12 months    Normal serum potassium on file in past 12 months    Normal serum sodium on file in past 12 months     Last Office Visit:              5/23/19, BP last reviewed 1/12/18  Future Office visit:           None.    Patient overdue for comprehensive visit. Called and spoke to Patient after verifying last name and date of birth. He was reminded of the above information. Pt transferred to scheduling line, to set up appointment:    Next 5 appointments (look out 90 days)    Feb 17, 2020  4:00 PM CST  PHYSICAL with Giacomo Garcia MD  [facility] ([facility] [address]  [place] [address]  [phone]        Prescriptions approved per [facility] Refill Protocol for 30-day supply, to get Patient through until appointment. Lauryn Sanchez RN .............. 2/11/2020  9:36 AM

## 2020-02-17 ENCOUNTER — OFFICE VISIT (OUTPATIENT)
Dept: FAMILY MEDICINE | Facility: OTHER | Age: 45
End: 2020-02-17
Attending: FAMILY MEDICINE
Payer: COMMERCIAL

## 2020-02-17 VITALS
HEIGHT: 74 IN | DIASTOLIC BLOOD PRESSURE: 70 MMHG | RESPIRATION RATE: 16 BRPM | BODY MASS INDEX: 27.64 KG/M2 | HEART RATE: 72 BPM | WEIGHT: 215.4 LBS | SYSTOLIC BLOOD PRESSURE: 102 MMHG | TEMPERATURE: 97.6 F

## 2020-02-17 DIAGNOSIS — Z02.89 HEALTH EXAMINATION OF DEFINED SUBPOPULATION: Primary | ICD-10-CM

## 2020-02-17 DIAGNOSIS — I10 ESSENTIAL HYPERTENSION: ICD-10-CM

## 2020-02-17 LAB
ANION GAP SERPL CALCULATED.3IONS-SCNC: 7 MMOL/L (ref 3–14)
BUN SERPL-MCNC: 19 MG/DL (ref 7–25)
CALCIUM SERPL-MCNC: 9.6 MG/DL (ref 8.6–10.3)
CHLORIDE SERPL-SCNC: 100 MMOL/L (ref 98–107)
CHOLEST SERPL-MCNC: 189 MG/DL
CO2 SERPL-SCNC: 31 MMOL/L (ref 21–31)
CREAT SERPL-MCNC: 1.09 MG/DL (ref 0.7–1.3)
GFR SERPL CREATININE-BSD FRML MDRD: 73 ML/MIN/{1.73_M2}
GLUCOSE SERPL-MCNC: 97 MG/DL (ref 70–105)
HDLC SERPL-MCNC: 47 MG/DL (ref 23–92)
LDLC SERPL CALC-MCNC: 117 MG/DL
NONHDLC SERPL-MCNC: 142 MG/DL
POTASSIUM SERPL-SCNC: 3.7 MMOL/L (ref 3.5–5.1)
SODIUM SERPL-SCNC: 138 MMOL/L (ref 134–144)
TRIGL SERPL-MCNC: 124 MG/DL

## 2020-02-17 PROCEDURE — 36415 COLL VENOUS BLD VENIPUNCTURE: CPT | Mod: ZL | Performed by: FAMILY MEDICINE

## 2020-02-17 PROCEDURE — 99396 PREV VISIT EST AGE 40-64: CPT | Performed by: FAMILY MEDICINE

## 2020-02-17 PROCEDURE — 80048 BASIC METABOLIC PNL TOTAL CA: CPT | Mod: ZL | Performed by: FAMILY MEDICINE

## 2020-02-17 PROCEDURE — 80061 LIPID PANEL: CPT | Mod: ZL | Performed by: FAMILY MEDICINE

## 2020-02-17 RX ORDER — LISINOPRIL 10 MG/1
TABLET ORAL
Qty: 90 TABLET | Refills: 4 | Status: SHIPPED | OUTPATIENT
Start: 2020-02-17 | End: 2021-02-26

## 2020-02-17 RX ORDER — HYDROCHLOROTHIAZIDE 25 MG/1
TABLET ORAL
Qty: 90 TABLET | Refills: 4 | Status: SHIPPED | OUTPATIENT
Start: 2020-02-17 | End: 2021-02-26

## 2020-02-17 ASSESSMENT — PATIENT HEALTH QUESTIONNAIRE - PHQ9
SUM OF ALL RESPONSES TO PHQ QUESTIONS 1-9: 0
5. POOR APPETITE OR OVEREATING: NOT AT ALL

## 2020-02-17 ASSESSMENT — ANXIETY QUESTIONNAIRES
1. FEELING NERVOUS, ANXIOUS, OR ON EDGE: NOT AT ALL
GAD7 TOTAL SCORE: 0
3. WORRYING TOO MUCH ABOUT DIFFERENT THINGS: NOT AT ALL
5. BEING SO RESTLESS THAT IT IS HARD TO SIT STILL: NOT AT ALL
2. NOT BEING ABLE TO STOP OR CONTROL WORRYING: NOT AT ALL
7. FEELING AFRAID AS IF SOMETHING AWFUL MIGHT HAPPEN: NOT AT ALL
6. BECOMING EASILY ANNOYED OR IRRITABLE: NOT AT ALL

## 2020-02-17 ASSESSMENT — PAIN SCALES - GENERAL: PAINLEVEL: NO PAIN (0)

## 2020-02-17 ASSESSMENT — MIFFLIN-ST. JEOR: SCORE: 1936.8

## 2020-02-17 NOTE — LETTER
February 18, 2020      Gabriel Saez  [address] [address]        Dear ,    We are writing to inform you of your test results.    Your test results fall within the expected range(s) or remain unchanged from previous results.  Please continue with current treatment plan.  The 10-year ASCVD risk score (Danya SILVERIO Jr., et al., 2013) is: 1.4%    Values used to calculate the score:      Age: 44 years      Sex: Male      Is Non- : No      Diabetic: No      Tobacco smoker: No      Systolic Blood Pressure: 102 mmHg      Is BP treated: Yes      HDL Cholesterol: 47 mg/dL      Total Cholesterol: 189 mg/dL  As you can see your cardiac risk score was 1.4% which is considered excellent.    Resulted Orders   Lipid Panel   Result Value Ref Range    Cholesterol 189 <200 mg/dL    Triglycerides 124 <150 mg/dL    HDL Cholesterol 47 23 - 92 mg/dL    LDL Cholesterol Calculated 117 (H) <100 mg/dL      Comment:      Above desirable:  100-129 mg/dl  Borderline High:  130-159 mg/dL  High:             160-189 mg/dL  Very high:       >189 mg/dl      Non HDL Cholesterol 142 (H) <130 mg/dL      Comment:      Above Desirable:  130-159 mg/dl  Borderline high:  160-189 mg/dl  High:             190-219 mg/dl  Very high:       >219 mg/dl     Basic Metabolic Panel   Result Value Ref Range    Sodium 138 134 - 144 mmol/L    Potassium 3.7 3.5 - 5.1 mmol/L    Chloride 100 98 - 107 mmol/L    Carbon Dioxide 31 21 - 31 mmol/L    Anion Gap 7 3 - 14 mmol/L    Glucose 97 70 - 105 mg/dL    Urea Nitrogen 19 7 - 25 mg/dL    Creatinine 1.09 0.70 - 1.30 mg/dL    GFR Estimate 73 >60 mL/min/[1.73_m2]    GFR Estimate If Black 89 >60 mL/min/[1.73_m2]    Calcium 9.6 8.6 - 10.3 mg/dL       If you have any questions or concerns, please call the clinic at the number listed above.       Sincerely,        Giacomo Garcia MD

## 2020-02-17 NOTE — NURSING NOTE
Patient here for yearly physical and medication refills. Last eye exam May 2019 and last dental exam October 2019.  Medication Reconciliation: complete.    Maria Esther Soni LPN  2/17/2020 4:15 PM
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
## 2020-02-18 ASSESSMENT — ANXIETY QUESTIONNAIRES: GAD7 TOTAL SCORE: 0

## 2020-03-11 ENCOUNTER — HEALTH MAINTENANCE LETTER (OUTPATIENT)
Age: 45
End: 2020-03-11

## 2020-07-15 ENCOUNTER — OFFICE VISIT (OUTPATIENT)
Dept: FAMILY MEDICINE | Facility: OTHER | Age: 45
End: 2020-07-15
Attending: FAMILY MEDICINE
Payer: COMMERCIAL

## 2020-07-15 ENCOUNTER — HOSPITAL ENCOUNTER (OUTPATIENT)
Dept: GENERAL RADIOLOGY | Facility: OTHER | Age: 45
End: 2020-07-15
Attending: FAMILY MEDICINE
Payer: COMMERCIAL

## 2020-07-15 VITALS
BODY MASS INDEX: 26.73 KG/M2 | DIASTOLIC BLOOD PRESSURE: 72 MMHG | HEART RATE: 64 BPM | SYSTOLIC BLOOD PRESSURE: 102 MMHG | RESPIRATION RATE: 16 BRPM | WEIGHT: 208.2 LBS | TEMPERATURE: 97.8 F

## 2020-07-15 DIAGNOSIS — M25.551 HIP PAIN, RIGHT: ICD-10-CM

## 2020-07-15 DIAGNOSIS — G89.29 CHRONIC PAIN OF RIGHT KNEE: ICD-10-CM

## 2020-07-15 DIAGNOSIS — M25.561 CHRONIC PAIN OF RIGHT KNEE: ICD-10-CM

## 2020-07-15 DIAGNOSIS — M25.551 HIP PAIN, RIGHT: Primary | ICD-10-CM

## 2020-07-15 PROCEDURE — 73562 X-RAY EXAM OF KNEE 3: CPT | Mod: RT

## 2020-07-15 PROCEDURE — 99213 OFFICE O/P EST LOW 20 MIN: CPT | Performed by: FAMILY MEDICINE

## 2020-07-15 PROCEDURE — 73502 X-RAY EXAM HIP UNI 2-3 VIEWS: CPT

## 2020-07-15 ASSESSMENT — PAIN SCALES - GENERAL: PAINLEVEL: MILD PAIN (3)

## 2020-07-15 NOTE — NURSING NOTE
Patient here for right hip and knee pain the past 10 days there has been shooting pain at night and he can not sleep. Medication Reconciliation: complete.    Maria Esther Soni LPN  7/15/2020 3:47 PM
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
## 2020-07-27 ENCOUNTER — HOSPITAL ENCOUNTER (OUTPATIENT)
Dept: PHYSICAL THERAPY | Facility: OTHER | Age: 45
Setting detail: THERAPIES SERIES
End: 2020-07-27
Attending: FAMILY MEDICINE
Payer: COMMERCIAL

## 2020-07-27 DIAGNOSIS — M25.551 HIP PAIN, RIGHT: ICD-10-CM

## 2020-07-27 PROCEDURE — 97110 THERAPEUTIC EXERCISES: CPT | Mod: GP | Performed by: PHYSICAL THERAPIST

## 2020-07-27 PROCEDURE — 97161 PT EVAL LOW COMPLEX 20 MIN: CPT | Mod: GP | Performed by: PHYSICAL THERAPIST
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
## 2020-07-29 ENCOUNTER — HOSPITAL ENCOUNTER (OUTPATIENT)
Dept: PHYSICAL THERAPY | Facility: OTHER | Age: 45
Setting detail: THERAPIES SERIES
End: 2020-07-29
Attending: FAMILY MEDICINE
Payer: COMMERCIAL

## 2020-07-29 PROCEDURE — 97110 THERAPEUTIC EXERCISES: CPT | Mod: GP | Performed by: PHYSICAL THERAPIST

## 2020-08-05 ENCOUNTER — HOSPITAL ENCOUNTER (OUTPATIENT)
Dept: PHYSICAL THERAPY | Facility: OTHER | Age: 45
Setting detail: THERAPIES SERIES
End: 2020-08-05
Attending: FAMILY MEDICINE
Payer: COMMERCIAL

## 2020-08-05 PROCEDURE — 97140 MANUAL THERAPY 1/> REGIONS: CPT | Mod: GP | Performed by: PHYSICAL THERAPIST

## 2020-08-05 PROCEDURE — 97110 THERAPEUTIC EXERCISES: CPT | Mod: GP | Performed by: PHYSICAL THERAPIST

## 2020-08-17 ENCOUNTER — HOSPITAL ENCOUNTER (OUTPATIENT)
Dept: PHYSICAL THERAPY | Facility: OTHER | Age: 45
Setting detail: THERAPIES SERIES
End: 2020-08-17
Attending: FAMILY MEDICINE
Payer: COMMERCIAL

## 2020-08-17 PROCEDURE — 97140 MANUAL THERAPY 1/> REGIONS: CPT | Mod: GP | Performed by: PHYSICAL THERAPIST

## 2020-08-17 PROCEDURE — 97110 THERAPEUTIC EXERCISES: CPT | Mod: GP | Performed by: PHYSICAL THERAPIST

## 2020-08-31 ENCOUNTER — HOSPITAL ENCOUNTER (OUTPATIENT)
Dept: PHYSICAL THERAPY | Facility: OTHER | Age: 45
Setting detail: THERAPIES SERIES
End: 2020-08-31
Attending: FAMILY MEDICINE
Payer: COMMERCIAL

## 2020-08-31 PROCEDURE — 97110 THERAPEUTIC EXERCISES: CPT | Mod: GP | Performed by: PHYSICAL THERAPIST

## 2020-08-31 PROCEDURE — 97140 MANUAL THERAPY 1/> REGIONS: CPT | Mod: GP | Performed by: PHYSICAL THERAPIST

## 2020-09-28 ENCOUNTER — ALLIED HEALTH/NURSE VISIT (OUTPATIENT)
Dept: FAMILY MEDICINE | Facility: OTHER | Age: 45
End: 2020-09-28
Attending: FAMILY MEDICINE
Payer: COMMERCIAL

## 2020-09-28 DIAGNOSIS — Z20.822 COVID-19 RULED OUT: Primary | ICD-10-CM

## 2020-09-28 DIAGNOSIS — R09.81 NASAL CONGESTION: ICD-10-CM

## 2020-09-28 PROCEDURE — C9803 HOPD COVID-19 SPEC COLLECT: HCPCS

## 2020-09-28 PROCEDURE — U0003 INFECTIOUS AGENT DETECTION BY NUCLEIC ACID (DNA OR RNA); SEVERE ACUTE RESPIRATORY SYNDROME CORONAVIRUS 2 (SARS-COV-2) (CORONAVIRUS DISEASE [COVID-19]), AMPLIFIED PROBE TECHNIQUE, MAKING USE OF HIGH THROUGHPUT TECHNOLOGIES AS DESCRIBED BY CMS-2020-01-R: HCPCS | Mod: ZL | Performed by: FAMILY MEDICINE

## 2020-09-28 PROCEDURE — 99207 ZZC NO CHARGE NURSE ONLY: CPT

## 2020-09-30 LAB
SARS-COV-2 RNA SPEC QL NAA+PROBE: NOT DETECTED
SPECIMEN SOURCE: NORMAL

## 2020-10-20 NOTE — PROGRESS NOTES
Outpatient Physical Therapy Discharge Note     Patient: Gabriel Saez  : 1975    Beginning/End Dates:  20 to 20    Referring Provider: Dr Garcia    Therapy Diagnosis: M25.551 (ICD-10-CM) - Hip pain, right      Plan:  Discharge from therapy.    Discharge:   Pt has not returned to physical therapy after a trial of self mgmt techniques indicating that he is doing well with HEP. Please refer to pt's chart for most recent information on objective measurements, goals or any additional information. This is an unplanned DC    Reason for Discharge:   Patient has not scheduled further appointments.    Discharge Plan: Patient to continue home program.

## 2021-01-03 ENCOUNTER — HEALTH MAINTENANCE LETTER (OUTPATIENT)
Age: 46
End: 2021-01-03

## 2021-02-24 DIAGNOSIS — I10 ESSENTIAL HYPERTENSION: ICD-10-CM

## 2021-02-24 NOTE — LETTER
February 26, 2021      Gabriel Saez  [address] [address]        Dear Gabriel,       A refill of hydrochlorothiazide (HYDRODIURIL) 25 MG tablet and lisinopril (ZESTRIL) 10 MG tablet  has been requested by your pharmacy.  We noticed that it has been greater than 12 months since your last comprehensive visit and labs with Giacomo Garcia MD.  A limited 90 day supply has been sent to your pharmacy at this time.    Additional refills require a medication management appointment.  Your health is very important to us.  Please call the clinic at [phone] to schedule your appointment.    Thank you,    The Refill Nurse  [facility]

## 2021-02-26 RX ORDER — HYDROCHLOROTHIAZIDE 25 MG/1
TABLET ORAL
Qty: 90 TABLET | Refills: 0 | Status: SHIPPED | OUTPATIENT
Start: 2021-02-26 | End: 2021-06-01

## 2021-02-26 RX ORDER — LISINOPRIL 10 MG/1
TABLET ORAL
Qty: 90 TABLET | Refills: 0 | Status: SHIPPED | OUTPATIENT
Start: 2021-02-26 | End: 2021-06-01

## 2021-02-26 NOTE — TELEPHONE ENCOUNTER
"[id number] sent Rx request for the following:   hydrochlorothiazide (HYDRODIURIL) 25 MG tablet  Sig TAKE 1 TABLET BY MOUTH ONCE DAILY  Last Prescription Date:   2/17/2020  Last Fill Qty/Refills:         90, R-4            Diuretics (Including Combos) Protocol Failed - 2/24/2021  1:00 AM        Failed - Normal serum creatinine on file in past 12 months     Recent Labs   Lab Test 02/17/20  1646   CR 1.09              Failed - Normal serum potassium on file in past 12 months     Recent Labs   Lab Test 02/17/20  1646   POTASSIUM 3.7                    Failed - Normal serum sodium on file in past 12 months     Recent Labs   Lab Test 02/17/20  1646                 Passed - Blood pressure under 140/90 in past 12 months     BP Readings from Last 3 Encounters:   07/15/20 102/72   02/17/20 102/70   05/23/19 110/70                 Passed - Recent (12 mo) or future (30 days) visit within the authorizing provider's specialty     Patient has had an office visit with the authorizing provider or a provider within the authorizing providers department within the previous 12 mos or has a future within next 30 days. See \"Patient Info\" tab in inbasket, or \"Choose Columns\" in Meds & Orders section of the refill encounter.              Passed - Medication is active on med list        Passed - Patient is age 18 or older           lisinopril (ZESTRIL) 10 MG tablet  Sig: TAKE 1 TABLET BY MOUTH ONCE DAILY  Last Prescription Date:   2/17/2020   Last Fill Qty/Refills:         90, R-4    Last Office Visit:              2/17/2020  Future Office visit:           None-letter sent       ACE Inhibitors (Including Combos) Protocol Failed - 2/24/2021  1:00 AM        Failed - Normal serum creatinine on file in past 12 months     Recent Labs   Lab Test 02/17/20  1646   CR 1.09       Ok to refill medication if creatinine is low          Failed - Normal serum potassium on file in past 12 months     Recent Labs   Lab Test 02/17/20  1646 " "  POTASSIUM 3.7             Passed - Blood pressure under 140/90 in past 12 months     BP Readings from Last 3 Encounters:   07/15/20 102/72   02/17/20 102/70   05/23/19 110/70                 Passed - Recent (12 mo) or future (30 days) visit within the authorizing provider's specialty     Patient has had an office visit with the authorizing provider or a provider within the authorizing providers department within the previous 12 mos or has a future within next 30 days. See \"Patient Info\" tab in inbasket, or \"Choose Columns\" in Meds & Orders section of the refill encounter.              Passed - Medication is active on med list        Passed - Patient is age 18 or older        Prescription approved per [facility] Refill Protocol for 90 day supply. Due for annual visit, letter sent to notify patient.   Gabriela Thompson RN, BSN....................  2/26/2021   4:44 PM        "

## 2021-04-25 ENCOUNTER — HEALTH MAINTENANCE LETTER (OUTPATIENT)
Age: 46
End: 2021-04-25

## 2021-05-29 DIAGNOSIS — I10 ESSENTIAL HYPERTENSION: Primary | ICD-10-CM

## 2021-05-29 NOTE — LETTER
June 1, 2021      Gabriel Saez  [address] [address]        Dear Gabriel,     This letter is to remind you that you will be due for your annual exam with Giacomo Garcia around 7/15/21.    A LIMITED refill of hydrochlorothiazide (HYDRODIURIL) 25 MG tablet and lisinopril (ZESTRIL) 10 MG tablet has been called into your pharmacy. Additional refills require you to complete this appointment.    Please call the clinic at [phone] to schedule your appointment.    If you should require additional refills before your scheduled appointment, please contact your pharmacy and we will refill your medication until the date of your appointment.    Thank you for choosing [facility] and [facility] for your health care needs.    Sincerely,    Refill RN  [facility];

## 2021-06-01 RX ORDER — HYDROCHLOROTHIAZIDE 25 MG/1
TABLET ORAL
Qty: 90 TABLET | Refills: 0 | Status: SHIPPED | OUTPATIENT
Start: 2021-06-01 | End: 2021-08-31

## 2021-06-01 RX ORDER — LISINOPRIL 10 MG/1
TABLET ORAL
Qty: 90 TABLET | Refills: 0 | Status: SHIPPED | OUTPATIENT
Start: 2021-06-01 | End: 2021-08-31

## 2021-06-01 NOTE — TELEPHONE ENCOUNTER
[id number] ([employer] of [place] sent Rx request for the following:      Requested Prescriptions   Pending Prescriptions Disp Refills   hydrochlorothiazide (HYDRODIURIL) 25 MG tablet [Pharmacy Med Name: HYDROCHLOROTHIAZIDE 25MG TAB] 90 tablet 0    Sig: TAKE 1 TABLET BY MOUTH ONCE DAILY   Last Prescription Date:   2/26/21  Last Fill Qty/Refills:         90, R-0       Diuretics (Including Combos) Protocol Failed - 6/1/2021 10:06 AM       Failed - Normal serum creatinine on file in past 12 months       Failed - Normal serum potassium on file in past 12 months       Failed - Normal serum sodium on file in past 12 months      lisinopril (ZESTRIL) 10 MG tablet [Pharmacy Med Name: LISINOPRIL 10MG TABLET] 90 tablet 0    Sig: TAKE 1 TABLET BY MOUTH ONCE DAILY   Last Prescription Date:   2/26/21  Last Fill Qty/Refills:         90, R-0       ACE Inhibitors (Including Combos) Protocol Failed - 6/1/2021 10:06 AM       Failed - Normal serum creatinine on file in past 12 months       Failed - Normal serum potassium on file in past 12 months     Last Office Visit:              7/15/20   Future Office visit:           None    Patient will be due for annual exam around 7/15/21. Reminder letter sent to Pt. Prescription approved per [facility] Refill Protocol for 90 day alex refill. Lauryn Sanchez RN .............. 6/1/2021  10:13 AM

## 2021-08-29 DIAGNOSIS — I10 ESSENTIAL HYPERTENSION: ICD-10-CM

## 2021-08-31 ENCOUNTER — OFFICE VISIT (OUTPATIENT)
Dept: FAMILY MEDICINE | Facility: OTHER | Age: 46
End: 2021-08-31
Attending: FAMILY MEDICINE
Payer: COMMERCIAL

## 2021-08-31 VITALS
RESPIRATION RATE: 16 BRPM | HEART RATE: 81 BPM | SYSTOLIC BLOOD PRESSURE: 130 MMHG | HEIGHT: 74 IN | TEMPERATURE: 97.6 F | WEIGHT: 220.4 LBS | DIASTOLIC BLOOD PRESSURE: 86 MMHG | BODY MASS INDEX: 28.28 KG/M2 | OXYGEN SATURATION: 97 %

## 2021-08-31 DIAGNOSIS — R29.898 SHOULDER WEAKNESS: ICD-10-CM

## 2021-08-31 DIAGNOSIS — I10 ESSENTIAL HYPERTENSION: Primary | ICD-10-CM

## 2021-08-31 LAB
ANION GAP SERPL CALCULATED.3IONS-SCNC: 10 MMOL/L (ref 3–14)
BUN SERPL-MCNC: 18 MG/DL (ref 7–25)
CALCIUM SERPL-MCNC: 9.7 MG/DL (ref 8.6–10.3)
CHLORIDE BLD-SCNC: 100 MMOL/L (ref 98–107)
CO2 SERPL-SCNC: 27 MMOL/L (ref 21–31)
CREAT SERPL-MCNC: 0.94 MG/DL (ref 0.7–1.3)
GFR SERPL CREATININE-BSD FRML MDRD: >90 ML/MIN/1.73M2
GLUCOSE BLD-MCNC: 86 MG/DL (ref 70–105)
POTASSIUM BLD-SCNC: 3.8 MMOL/L (ref 3.5–5.1)
SODIUM SERPL-SCNC: 137 MMOL/L (ref 134–144)

## 2021-08-31 PROCEDURE — 36415 COLL VENOUS BLD VENIPUNCTURE: CPT | Mod: ZL | Performed by: FAMILY MEDICINE

## 2021-08-31 PROCEDURE — 99214 OFFICE O/P EST MOD 30 MIN: CPT | Performed by: FAMILY MEDICINE

## 2021-08-31 PROCEDURE — 80048 BASIC METABOLIC PNL TOTAL CA: CPT | Mod: ZL | Performed by: FAMILY MEDICINE

## 2021-08-31 RX ORDER — HYDROCHLOROTHIAZIDE 25 MG/1
25 TABLET ORAL DAILY
Qty: 90 TABLET | Refills: 4 | Status: SHIPPED | OUTPATIENT
Start: 2021-08-31 | End: 2022-08-31

## 2021-08-31 RX ORDER — LISINOPRIL 20 MG/1
20 TABLET ORAL DAILY
Qty: 90 TABLET | Refills: 4 | Status: SHIPPED | OUTPATIENT
Start: 2021-08-31 | End: 2022-08-31

## 2021-08-31 ASSESSMENT — MIFFLIN-ST. JEOR: SCORE: 1954.48

## 2021-08-31 ASSESSMENT — PAIN SCALES - GENERAL: PAINLEVEL: NO PAIN (0)
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
## 2021-08-31 NOTE — PROGRESS NOTES
"  SUBJECTIVE:   Gabriel Saez is a 45 year old male who presents to clinic today for the following health issues: Blood pressure follow-up right shoulder pain  The 10-year ASCVD risk score (Danya SILVERIO JrCelestina, et al., 2013) is: 2.4%    Values used to calculate the score:      Age: 45 years      Sex: Male      Is Non- : No      Diabetic: No      Tobacco smoker: No      Systolic Blood Pressure: 130 mmHg      Is BP treated: Yes      HDL Cholesterol: 47 mg/dL      Total Cholesterol: 189 mg/dL      Patient arrives here for blood pressure follow-up.  He is in need of labs.  Blood pressure has been good at home.  He also is concerned about his right shoulder.  Proxy 1 month ago he fell off a tube.  This extended his shoulder and externally rotated.  He get the water fell a sudden onset of pain.  He felt his shoulder .  Shortly after the injury his shoulder became black and blue.  He has had trouble pulling a bow back.  Trouble throwing a baseball any more than 50 feet.  He also has trouble pull free his arm and back.  He feels his shoulder is weak.  He has trouble laying on it.  Is also noticed a deformity in his right shoulder when compared to his left        Patient Active Problem List    Diagnosis Date Noted     HTN (hypertension) 06/25/2015     Priority: Medium     Overweight 01/04/2011     Priority: Medium     Past Medical History:   Diagnosis Date     Closed fracture of right femur (H)     age 21     Closed fracture of shaft of fibula     age 3     Essential (primary) hypertension     No Comments Provided        Review of Systems     OBJECTIVE:     /86 (BP Location: Other (Comment))   Pulse 81   Temp 97.6  F (36.4  C)   Resp 16   Ht 1.88 m (6' 2\")   Wt 100 kg (220 lb 6.4 oz)   SpO2 97%   BMI 28.30 kg/m    Body mass index is 28.3 kg/m .  Physical Exam  Constitutional:       Appearance: Normal appearance.   Cardiovascular:      Rate and Rhythm: Normal rate and regular rhythm. " BACK PAIN   Musculoskeletal:      Comments: Patient has decreased strength with internal rotation.  He can abduct fully but associated with pain discomfort.  There is asymmetry between both shoulders.  Some mild pain over the bicipital groove.   Neurological:      Mental Status: He is alert.         Diagnostic Test Results:  none     ASSESSMENT/PLAN:         (I10) Essential hypertension  (primary encounter diagnosis)  Comment: Patient has had a number of high blood pressures.  We will increase his Zestril to 10 mg.  Plan: lisinopril (ZESTRIL) 20 MG tablet,         hydrochlorothiazide (HYDRODIURIL) 25 MG tablet,        Basic Metabolic Panel            (R29.898) Shoulder weakness  Comment: I suspect a bicep tendon tear versus a labrum tear  Plan: MR Shoulder Right w/o Contrast, XR Shoulder         Right G/E 3 Views       Discussed proceeding with sports medicine consult versus MRI.  Patient would like to proceed with MRI        Giacomo Garcia MD  [facility] AND [facility]

## 2021-08-31 NOTE — TELEPHONE ENCOUNTER
"[id number] sent Rx request for the following:      Requested Prescriptions   Pending Prescriptions Disp Refills     hydrochlorothiazide (HYDRODIURIL) 25 MG tablet [Pharmacy Med Name: HYDROCHLOROTHIAZIDE 25MG TAB] 90 tablet 0     Sig: TAKE 1 TABLET BY MOUTH ONCEDAILY       Diuretics (Including Combos) Protocol Failed - 8/31/2021 10:37 AM        Failed - Blood pressure under 140/90 in past 12 months     BP Readings from Last 3 Encounters:   07/15/20 102/72   02/17/20 102/70   05/23/19 110/70           Failed - Normal serum creatinine on file in past 12 months     Recent Labs   Lab Test 02/17/20  1646   CR 1.09           Failed - Normal serum potassium on file in past 12 months     Recent Labs   Lab Test 02/17/20  1646   POTASSIUM 3.7             Failed - Normal serum sodium on file in past 12 months     Recent Labs   Lab Test 02/17/20  1646               Passed - Recent (12 mo) or future (30 days) visit within the authorizing provider's specialty     Patient has had an office visit with the authorizing provider or a provider within the authorizing providers department within the previous 12 mos or has a future within next 30 days. See \"Patient Info\" tab in inbasket, or \"Choose Columns\" in Meds & Orders section of the refill encounter.          Passed - Medication is active on med list        Passed - Patient is age 18 or older        Last Prescription Date:   06/1/21  Last Fill Qty/Refills:         90, R-0         lisinopril (ZESTRIL) 10 MG tablet [Pharmacy Med Name: LISINOPRIL 10MG TABLET] 90 tablet 0     Sig: TAKE 1 TABLET BY MOUTH ONCEDAILY       ACE Inhibitors (Including Combos) Protocol Failed - 8/29/2021  5:11 AM        Failed - Blood pressure under 140/90 in past 12 months     BP Readings from Last 3 Encounters:   07/15/20 102/72   02/17/20 102/70   05/23/19 110/70           Failed - Normal serum creatinine on file in past 12 months     Recent Labs   Lab Test 02/17/20  1646   CR 1.09     Ok to " "refill medication if creatinine is low          Failed - Normal serum potassium on file in past 12 months     Recent Labs   Lab Test 02/17/20  1646   POTASSIUM 3.7           Passed - Recent (12 mo) or future (30 days) visit within the authorizing provider's specialty     Patient has had an office visit with the authorizing provider or a provider within the authorizing providers department within the previous 12 mos or has a future within next 30 days. See \"Patient Info\" tab in inbasket, or \"Choose Columns\" in Meds & Orders section of the refill encounter.            Passed - Medication is active on med list        Passed - Patient is age 18 or older     Last Prescription Date:   6/1/21  Last Fill Qty/Refills:         90, R-0  Last Office Visit:              07/15/21  Future Office visit:             Next 5 appointments (look out 90 days)    Aug 31, 2021  4:20 PM  SHORT with Giacomo Garcia MD  [facility] ([facility] ) [address]  [place] [address]  [phone]        Routing refill request to provider for review/approval because:  Unable to complete prescription refill per RN Medication Refill Policy. Evette Soni RN .............. 8/31/2021  10:44 AM              "

## 2021-09-01 RX ORDER — HYDROCHLOROTHIAZIDE 25 MG/1
TABLET ORAL
Qty: 90 TABLET | Refills: 0 | Status: SHIPPED | OUTPATIENT
Start: 2021-09-01 | End: 2023-09-18

## 2021-09-01 RX ORDER — LISINOPRIL 10 MG/1
TABLET ORAL
Qty: 90 TABLET | Refills: 0 | Status: SHIPPED | OUTPATIENT
Start: 2021-09-01 | End: 2023-09-19

## 2021-09-03 ENCOUNTER — HOSPITAL ENCOUNTER (OUTPATIENT)
Dept: GENERAL RADIOLOGY | Facility: OTHER | Age: 46
Discharge: HOME | End: 2021-09-03
Attending: FAMILY MEDICINE | Admitting: FAMILY MEDICINE
Payer: COMMERCIAL

## 2021-09-03 DIAGNOSIS — R29.898 SHOULDER WEAKNESS: ICD-10-CM

## 2021-09-03 PROCEDURE — 73030 X-RAY EXAM OF SHOULDER: CPT | Mod: RT

## 2021-10-09 ENCOUNTER — HEALTH MAINTENANCE LETTER (OUTPATIENT)
Age: 46
End: 2021-10-09

## 2022-02-20 NOTE — NURSING NOTE
Patient Information     Patient Name MRN Sex Gabriel Garg [id number] Male 1975      Nursing Note by La Romero LPN at 2018  3:40 PM     Author:  La Romero LPN Service:  (none) Author Type:  NURS- Licensed Practical Nurse     Filed:  2018  3:52 PM Encounter Date:  2018 Status:  Signed     :  La Romero LPN (NURS- Licensed Practical Nurse)            The patient is here today to have a consult.  La Romero LPN......2018  3:41 PM           periumbilical

## 2022-05-21 ENCOUNTER — HEALTH MAINTENANCE LETTER (OUTPATIENT)
Age: 47
End: 2022-05-21

## 2022-08-29 DIAGNOSIS — I10 ESSENTIAL HYPERTENSION: ICD-10-CM

## 2022-08-29 NOTE — LETTER
August 30, 2022      Gabriel Saez  [address] [address]    This letter is to remind you that you are due for your annual exam with Giacomo Garcia. Your last comprehensive visit was more than 12 months ago.    Please call the clinic at [phone] to schedule your appointment.    Thank you for choosing [facility] and [facility] for your health care needs.    Sincerely,    Refyessi KRISHNA  [facility]

## 2022-08-30 NOTE — TELEPHONE ENCOUNTER
[id number] ([facility]) - [place], [place] - [address]   sent Rx request for the following:      Requested Prescriptions   Pending Prescriptions Disp Refills     lisinopril (ZESTRIL) 20 MG tablet [Pharmacy Med Name: LISINOPRIL 20MG TABLET] 90 tablet 4     Sig: TAKE 1 TABLET (20 MG) BY MOUTH DAILY   Last Prescription Date:   08/31/2022  Last Fill Qty/Refills:         90, R-4                              hydrochlorothiazide (HYDRODIURIL) 25 MG tablet [Pharmacy Med Name: HYDROCHLOROTHIAZIDE 25MG TAB] 90 tablet 4     Sig: TAKE 1 TABLET (25 MG) BY MOUTH DAILY          Last Prescription Date:   08/31/2021  Last Fill Qty/Refills:         90, R-4   Last Office Visit:              08/31/2021  Future Office visit:           None    Patient needs to be scheduled for an annual visit. Letter sent to patient.    Erica Jane RN on 8/30/2022 at 8:39 AM

## 2022-08-31 RX ORDER — HYDROCHLOROTHIAZIDE 25 MG/1
25 TABLET ORAL DAILY
Qty: 90 TABLET | Refills: 4 | Status: SHIPPED | OUTPATIENT
Start: 2022-08-31 | End: 2023-09-19

## 2022-08-31 RX ORDER — LISINOPRIL 20 MG/1
20 TABLET ORAL DAILY
Qty: 90 TABLET | Refills: 4 | Status: SHIPPED | OUTPATIENT
Start: 2022-08-31 | End: 2023-09-15

## 2022-09-14 NOTE — TELEPHONE ENCOUNTER
[facility]    Patient due for annual visit.    Thea Kerr on 9/14/2022 at 11:20 AM

## 2022-09-16 NOTE — TELEPHONE ENCOUNTER
LMTCB x 2 to schedule appointment.    Patient due for annual visit.    Thea Kerr on 9/16/2022 at 3:12 PM

## 2022-09-17 ENCOUNTER — HEALTH MAINTENANCE LETTER (OUTPATIENT)
Age: 47
End: 2022-09-17

## 2022-09-20 NOTE — TELEPHONE ENCOUNTER
Reminder letter sent to Pt at initiation of encounter (dated 8/30/22). Lauryn Sanchez RN .............. 9/20/2022  11:25 AM

## 2022-09-20 NOTE — TELEPHONE ENCOUNTER
LMTCB x 3 to schedule appointment.    Pt due for annual visit. Please send letter.    Thea Kerr on 9/20/2022 at 11:23 AM

## 2023-06-04 ENCOUNTER — HEALTH MAINTENANCE LETTER (OUTPATIENT)
Age: 48
End: 2023-06-04

## 2023-09-08 DIAGNOSIS — I10 ESSENTIAL HYPERTENSION: ICD-10-CM

## 2023-09-08 NOTE — TELEPHONE ENCOUNTER
[facility] sent Rx request for the following:      Requested Prescriptions   Pending Prescriptions Disp Refills    lisinopril (ZESTRIL) 20 MG tablet 90 tablet 4     Last Prescription Date:   8/31/22  Last Fill Qty/Refills:         90, R-4    Last Office Visit:              8/31/21   Future Office visit:           none     Tasneem Jasmine RN on 9/8/2023 at 3:09 PM

## 2023-09-11 RX ORDER — LISINOPRIL 20 MG/1
20 TABLET ORAL DAILY
Qty: 90 TABLET | Refills: 0 | OUTPATIENT
Start: 2023-09-11

## 2023-09-13 DIAGNOSIS — I10 ESSENTIAL HYPERTENSION: ICD-10-CM

## 2023-09-13 NOTE — TELEPHONE ENCOUNTER
Patient enrolled in our Rx Med Sync service to improve adherence. We are requesting a refill authorization in advance to ensure an active prescription is on file.    Lauryn Sanchez RN .............. 9/13/2023  3:01 PM

## 2023-09-14 RX ORDER — LISINOPRIL 20 MG/1
20 TABLET ORAL DAILY
Qty: 90 TABLET | Refills: 4 | OUTPATIENT
Start: 2023-09-14

## 2023-09-14 RX ORDER — HYDROCHLOROTHIAZIDE 25 MG/1
TABLET ORAL
Qty: 90 TABLET | Refills: 0 | OUTPATIENT
Start: 2023-09-14

## 2023-09-14 NOTE — TELEPHONE ENCOUNTER
[id number] ([facility]) of [place] sent Rx request for the following:      Requested Prescriptions   Pending Prescriptions Disp Refills    hydrochlorothiazide (HYDRODIURIL) 25 MG tablet 90 tablet 0     Sig: TAKE 1 TABLET BY MOUTH ONCEDAILY       Diuretics (Including Combos) Protocol Failed - 9/14/2023  1:30 PM        Failed - Blood pressure under 140/90 in past 12 months     BP Readings from Last 3 Encounters:   08/31/21 130/86   07/15/20 102/72   02/17/20 102/70           Failed - Recent (12 mo) or future (30 days) visit within the authorizing provider's specialty        Failed - Normal serum creatinine on file in past 12 months     Recent Labs   Lab Test 08/31/21  1705   CR 0.94           Failed - Normal serum potassium on file in past 12 months     Recent Labs   Lab Test 08/31/21  1705   POTASSIUM 3.8           Failed - Normal serum sodium on file in past 12 months     Recent Labs   Lab Test 08/31/21  1705           Last Prescription Date:   8/31/22  Last Fill Qty/Refills:         90, R-4    Last Office Visit:              8/31/21   Future Office visit:           None    Per refill encounter, dated 9/8, and per provider, Pt needs appointment. Pharmacy notified. Lauryn Sanchez RN .............. 9/14/2023  1:32 PM

## 2023-09-14 NOTE — TELEPHONE ENCOUNTER
[id number] ([employer]) of [place] sent Rx request for the following:      Requested Prescriptions   Pending Prescriptions Disp Refills    lisinopril (ZESTRIL) 20 MG tablet 90 tablet 4     Sig: Take 1 tablet (20 mg) by mouth daily       ACE Inhibitors (Including Combos) Protocol Failed - 9/14/2023  1:30 PM        Failed - Blood pressure under 140/90 in past 12 months     BP Readings from Last 3 Encounters:   08/31/21 130/86   07/15/20 102/72   02/17/20 102/70           Failed - Recent (12 mo) or future (30 days) visit within the authorizing provider's specialty        Failed - Normal serum creatinine on file in past 12 months     Recent Labs   Lab Test 08/31/21  1705   CR 0.94           Failed - Normal serum potassium on file in past 12 months     Recent Labs   Lab Test 08/31/21  1705   POTASSIUM 3.8        Last Prescription Date:   8/31/22  Last Fill Qty/Refills:         90, R-4    Last Office Visit:              8/31/21   Future Office visit:           None     Per refill encounter, dated 9/8, and per provider, Pt needs appointment. Pharmacy notified. Lauryn Sanchez RN .............. 9/14/2023  1:32 PM

## 2023-09-15 DIAGNOSIS — I10 ESSENTIAL HYPERTENSION: ICD-10-CM

## 2023-09-15 RX ORDER — LISINOPRIL 20 MG/1
20 TABLET ORAL DAILY
Qty: 7 TABLET | Refills: 0 | Status: SHIPPED | OUTPATIENT
Start: 2023-09-15 | End: 2023-09-18

## 2023-09-15 NOTE — TELEPHONE ENCOUNTER
Reason for call: Medication or medication refill    Name of medication requested: lisinopril     How many days of medication do you have left? Pt is out    What pharmacy do you use? [facility]    Preferred method for responding to this message: Telephone Call    Phone number patient can be reached at: Cell number on file:    Telephone Information:   Mobile [phone]       If we cannot reach you directly, may we leave a detailed response at the number you provided? Yes     Has appt with [facility] on 9/18. Would like enough pills to make it until appt

## 2023-09-15 NOTE — TELEPHONE ENCOUNTER
Patient sent Rx request for the following:      Requested Prescriptions   Pending Prescriptions Disp Refills    lisinopril (ZESTRIL) 20 MG tablet 7 tablet 0     Sig: Take 1 tablet (20 mg) by mouth daily       There is no refill protocol information for this order        Patient is currently out of medication and has an appointment with PCP on 9/18. Per 9/8 refill encounter notes PCP stated patient needed appointment for refills. Appointment is now made and patient is hoping for something to cover until Monday. He is requesting a short supply from a covering provider to make it until that appointment on Monday. Will route to covering provider for review and consideration. 7 day supply teed up.     Last Prescription Date:   8/31/2022  Last Fill Qty/Refills:         90, R-4    Last Office Visit:              8/31/21 [facility]   Future Office visit:           9/18/2023 [facility]    Viji Gonsalez RN on 9/15/2023 at 11:19 AM

## 2023-09-18 ENCOUNTER — OFFICE VISIT (OUTPATIENT)
Dept: FAMILY MEDICINE | Facility: OTHER | Age: 48
End: 2023-09-18
Attending: FAMILY MEDICINE
Payer: COMMERCIAL

## 2023-09-18 VITALS
BODY MASS INDEX: 29.31 KG/M2 | WEIGHT: 221.2 LBS | TEMPERATURE: 97.8 F | HEIGHT: 73 IN | RESPIRATION RATE: 20 BRPM | DIASTOLIC BLOOD PRESSURE: 80 MMHG | SYSTOLIC BLOOD PRESSURE: 102 MMHG | HEART RATE: 97 BPM | OXYGEN SATURATION: 98 %

## 2023-09-18 DIAGNOSIS — Z02.89 HEALTH EXAMINATION OF DEFINED SUBPOPULATION: Primary | ICD-10-CM

## 2023-09-18 DIAGNOSIS — Z12.11 COLON CANCER SCREENING: ICD-10-CM

## 2023-09-18 DIAGNOSIS — I10 ESSENTIAL HYPERTENSION: ICD-10-CM

## 2023-09-18 LAB
ANION GAP SERPL CALCULATED.3IONS-SCNC: 12 MMOL/L (ref 7–15)
BUN SERPL-MCNC: 19.5 MG/DL (ref 6–20)
CALCIUM SERPL-MCNC: 9.9 MG/DL (ref 8.6–10)
CHLORIDE SERPL-SCNC: 101 MMOL/L (ref 98–107)
CREAT SERPL-MCNC: 1.07 MG/DL (ref 0.67–1.17)
DEPRECATED HCO3 PLAS-SCNC: 28 MMOL/L (ref 22–29)
EGFRCR SERPLBLD CKD-EPI 2021: 86 ML/MIN/1.73M2
GLUCOSE SERPL-MCNC: 96 MG/DL (ref 70–99)
POTASSIUM SERPL-SCNC: 3.9 MMOL/L (ref 3.4–5.3)
SODIUM SERPL-SCNC: 141 MMOL/L (ref 136–145)

## 2023-09-18 PROCEDURE — 36415 COLL VENOUS BLD VENIPUNCTURE: CPT | Mod: ZL | Performed by: FAMILY MEDICINE

## 2023-09-18 PROCEDURE — 99396 PREV VISIT EST AGE 40-64: CPT | Mod: 25 | Performed by: FAMILY MEDICINE

## 2023-09-18 PROCEDURE — 90715 TDAP VACCINE 7 YRS/> IM: CPT | Performed by: FAMILY MEDICINE

## 2023-09-18 PROCEDURE — 90471 IMMUNIZATION ADMIN: CPT | Performed by: FAMILY MEDICINE

## 2023-09-18 PROCEDURE — 90472 IMMUNIZATION ADMIN EACH ADD: CPT | Performed by: FAMILY MEDICINE

## 2023-09-18 PROCEDURE — 90686 IIV4 VACC NO PRSV 0.5 ML IM: CPT | Performed by: FAMILY MEDICINE

## 2023-09-18 PROCEDURE — 82310 ASSAY OF CALCIUM: CPT | Mod: ZL | Performed by: FAMILY MEDICINE

## 2023-09-18 RX ORDER — HYDROCHLOROTHIAZIDE 25 MG/1
TABLET ORAL
Qty: 90 TABLET | Refills: 4 | Status: SHIPPED | OUTPATIENT
Start: 2023-09-18 | End: 2024-09-20

## 2023-09-18 RX ORDER — LISINOPRIL 20 MG/1
20 TABLET ORAL DAILY
Qty: 90 TABLET | Refills: 4 | Status: SHIPPED | OUTPATIENT
Start: 2023-09-18 | End: 2024-09-20

## 2023-09-18 ASSESSMENT — ENCOUNTER SYMPTOMS
MYALGIAS: 0
HEMATURIA: 0
DIARRHEA: 0
JOINT SWELLING: 0
CHILLS: 0
ABDOMINAL PAIN: 0
HEADACHES: 1
NERVOUS/ANXIOUS: 1
CONSTIPATION: 0
ARTHRALGIAS: 1
FREQUENCY: 1
SORE THROAT: 1
DYSURIA: 0
PALPITATIONS: 0
NAUSEA: 0
HEMATOCHEZIA: 0
PARESTHESIAS: 0
FEVER: 0
DIZZINESS: 0
SHORTNESS OF BREATH: 1
WEAKNESS: 0
HEARTBURN: 1
COUGH: 1
EYE PAIN: 0

## 2023-09-18 ASSESSMENT — PAIN SCALES - GENERAL: PAINLEVEL: NO PAIN (0)

## 2023-09-18 NOTE — PROGRESS NOTES
SUBJECTIVE:   Gabriel Saez is a 47 year old male who presents to clinic today for the following health issues: Physical  The 10-year ASCVD risk score (Josiah JIMENEZ, et al., 2019) is: 1.9%    Values used to calculate the score:      Age: 47 years      Sex: Male      Is Non- : No      Diabetic: No      Tobacco smoker: No      Systolic Blood Pressure: 102 mmHg      Is BP treated: Yes      HDL Cholesterol: 47 mg/dL      Total Cholesterol: 189 mg/dL      Healthy Habits:     Getting at least 3 servings of Calcium per day:  Yes    Bi-annual eye exam:  Yes    Dental care twice a year:  Yes    Sleep apnea or symptoms of sleep apnea:  Daytime drowsiness and Excessive snoring    Diet:  Regular (no restrictions)    Frequency of exercise:  2-3 days/week    Duration of exercise:  30-45 minutes    Taking medications regularly:  Yes    Medication side effects:  Lightheadedness and Other    Additional concerns today:  Yes            Review of Systems   Constitutional:  Negative for chills and fever.   HENT:  Positive for congestion, ear pain and sore throat. Negative for hearing loss.    Eyes:  Negative for pain and visual disturbance.   Respiratory:  Positive for cough and shortness of breath.    Cardiovascular:  Negative for chest pain, palpitations and peripheral edema.   Gastrointestinal:  Positive for heartburn. Negative for abdominal pain, constipation, diarrhea, hematochezia and nausea.   Genitourinary:  Positive for frequency. Negative for dysuria, genital sores, hematuria, impotence, penile discharge and urgency.   Musculoskeletal:  Positive for arthralgias. Negative for joint swelling and myalgias.   Skin:  Negative for rash.   Neurological:  Positive for headaches. Negative for dizziness, weakness and paresthesias.   Psychiatric/Behavioral:  Negative for mood changes. The patient is nervous/anxious.         OBJECTIVE:     /80   Pulse 97   Temp 97.8  F (36.6  C)   Resp 20   Ht 1.842 m  "(6' 0.5\")   Wt 100.3 kg (221 lb 3.2 oz)   SpO2 98%   BMI 29.59 kg/m    Body mass index is 29.59 kg/m .  Physical Exam    Diagnostic Test Results:  Labs reviewed in Epic    ASSESSMENT/PLAN:     Results for orders placed or performed in visit on 09/18/23   Basic Metabolic Panel     Status: Normal   Result Value Ref Range    Sodium 141 136 - 145 mmol/L    Potassium 3.9 3.4 - 5.3 mmol/L    Chloride 101 98 - 107 mmol/L    Carbon Dioxide (CO2) 28 22 - 29 mmol/L    Anion Gap 12 7 - 15 mmol/L    Urea Nitrogen 19.5 6.0 - 20.0 mg/dL    Creatinine 1.07 0.67 - 1.17 mg/dL    Calcium 9.9 8.6 - 10.0 mg/dL    Glucose 96 70 - 99 mg/dL    GFR Estimate 86 >60 mL/min/1.73m2         (Z02.89) Health examination of defined subpopulation  (primary encounter diagnosis)  Comment:   Plan:     (I10) Essential hypertension  Comment:   Plan: hydrochlorothiazide (HYDRODIURIL) 25 MG tablet,        lisinopril (ZESTRIL) 20 MG tablet, Basic         Metabolic Panel        Currently under good control    (Z12.11) Colon cancer screening  Comment: Discussed colonoscopy versus Cologuard patient wishes Cologuard  Plan: COLOGUARD([employer])              Giacomo Garcia MD  [facility]  "

## 2023-09-18 NOTE — NURSING NOTE
Patient here for annual physical, last eye exam 16 months ago and last dental exam June 2023. Medication Reconciliation: complete.    Maria Esther Soni LPN  9/18/2023 1:27 PM

## 2023-09-19 ASSESSMENT — ENCOUNTER SYMPTOMS
DIARRHEA: 0
SHORTNESS OF BREATH: 1
PARESTHESIAS: 0
CONSTIPATION: 0
HEMATOCHEZIA: 0
FEVER: 0
HEADACHES: 1
HEMATURIA: 0
MYALGIAS: 0
ABDOMINAL PAIN: 0
JOINT SWELLING: 0
EYE PAIN: 0
NAUSEA: 0
DIZZINESS: 0
NERVOUS/ANXIOUS: 1
CHILLS: 0
HEARTBURN: 1
DYSURIA: 0
FREQUENCY: 1
PALPITATIONS: 0
WEAKNESS: 0
COUGH: 1
ARTHRALGIAS: 1
SORE THROAT: 1

## 2023-10-02 ENCOUNTER — LAB (OUTPATIENT)
Dept: FAMILY MEDICINE | Facility: OTHER | Age: 48
End: 2023-10-02
Payer: COMMERCIAL

## 2023-10-02 DIAGNOSIS — Z12.11 COLON CANCER SCREENING: ICD-10-CM

## 2024-06-24 ENCOUNTER — HOSPITAL ENCOUNTER (OUTPATIENT)
Dept: GENERAL RADIOLOGY | Facility: OTHER | Age: 49
Discharge: HOME | End: 2024-06-24

## 2024-06-24 ENCOUNTER — OFFICE VISIT (OUTPATIENT)
Dept: FAMILY MEDICINE | Facility: OTHER | Age: 49
End: 2024-06-24

## 2024-06-24 VITALS
TEMPERATURE: 97.1 F | RESPIRATION RATE: 16 BRPM | BODY MASS INDEX: 28.36 KG/M2 | OXYGEN SATURATION: 98 % | SYSTOLIC BLOOD PRESSURE: 134 MMHG | DIASTOLIC BLOOD PRESSURE: 96 MMHG | HEIGHT: 74 IN | WEIGHT: 221 LBS | HEART RATE: 72 BPM

## 2024-06-24 DIAGNOSIS — M10.9 ACUTE GOUT INVOLVING TOE OF LEFT FOOT, UNSPECIFIED CAUSE: Primary | ICD-10-CM

## 2024-06-24 DIAGNOSIS — M79.672 LEFT FOOT PAIN: ICD-10-CM

## 2024-06-24 LAB
ALBUMIN SERPL BCG-MCNC: 4.4 G/DL (ref 3.5–5.2)
ALP SERPL-CCNC: 84 U/L (ref 40–150)
ALT SERPL W P-5'-P-CCNC: 22 U/L (ref 0–70)
ANION GAP SERPL CALCULATED.3IONS-SCNC: 8 MMOL/L (ref 7–15)
AST SERPL W P-5'-P-CCNC: 28 U/L (ref 0–45)
BASOPHILS # BLD AUTO: 0 10E3/UL (ref 0–0.2)
BASOPHILS NFR BLD AUTO: 1 %
BILIRUB SERPL-MCNC: 1 MG/DL
BUN SERPL-MCNC: 22.3 MG/DL (ref 6–20)
CALCIUM SERPL-MCNC: 10 MG/DL (ref 8.6–10)
CHLORIDE SERPL-SCNC: 100 MMOL/L (ref 98–107)
CREAT SERPL-MCNC: 0.87 MG/DL (ref 0.67–1.17)
CRP SERPL-MCNC: 15.56 MG/L
DEPRECATED HCO3 PLAS-SCNC: 30 MMOL/L (ref 22–29)
EGFRCR SERPLBLD CKD-EPI 2021: >90 ML/MIN/1.73M2
EOSINOPHIL # BLD AUTO: 0.1 10E3/UL (ref 0–0.7)
EOSINOPHIL NFR BLD AUTO: 2 %
ERYTHROCYTE [DISTWIDTH] IN BLOOD BY AUTOMATED COUNT: 12.3 % (ref 10–15)
GLUCOSE SERPL-MCNC: 96 MG/DL (ref 70–99)
HCT VFR BLD AUTO: 44.2 % (ref 40–53)
HGB BLD-MCNC: 15 G/DL (ref 13.3–17.7)
IMM GRANULOCYTES # BLD: 0 10E3/UL
IMM GRANULOCYTES NFR BLD: 0 %
LYMPHOCYTES # BLD AUTO: 1.5 10E3/UL (ref 0.8–5.3)
LYMPHOCYTES NFR BLD AUTO: 26 %
MCH RBC QN AUTO: 29.9 PG (ref 26.5–33)
MCHC RBC AUTO-ENTMCNC: 33.9 G/DL (ref 31.5–36.5)
MCV RBC AUTO: 88 FL (ref 78–100)
MONOCYTES # BLD AUTO: 0.6 10E3/UL (ref 0–1.3)
MONOCYTES NFR BLD AUTO: 10 %
NEUTROPHILS # BLD AUTO: 3.5 10E3/UL (ref 1.6–8.3)
NEUTROPHILS NFR BLD AUTO: 61 %
NRBC # BLD AUTO: 0 10E3/UL
NRBC BLD AUTO-RTO: 0 /100
PLATELET # BLD AUTO: 255 10E3/UL (ref 150–450)
POTASSIUM SERPL-SCNC: 4.1 MMOL/L (ref 3.4–5.3)
PROT SERPL-MCNC: 7.8 G/DL (ref 6.4–8.3)
RBC # BLD AUTO: 5.02 10E6/UL (ref 4.4–5.9)
SODIUM SERPL-SCNC: 138 MMOL/L (ref 135–145)
URATE SERPL-MCNC: 7.4 MG/DL (ref 3.4–7)
WBC # BLD AUTO: 5.7 10E3/UL (ref 4–11)

## 2024-06-24 PROCEDURE — 80053 COMPREHEN METABOLIC PANEL: CPT | Mod: ZL

## 2024-06-24 PROCEDURE — 84550 ASSAY OF BLOOD/URIC ACID: CPT | Mod: ZL

## 2024-06-24 PROCEDURE — 99214 OFFICE O/P EST MOD 30 MIN: CPT

## 2024-06-24 PROCEDURE — 86140 C-REACTIVE PROTEIN: CPT | Mod: ZL

## 2024-06-24 PROCEDURE — 73630 X-RAY EXAM OF FOOT: CPT | Mod: LT

## 2024-06-24 PROCEDURE — 85025 COMPLETE CBC W/AUTO DIFF WBC: CPT | Mod: ZL

## 2024-06-24 PROCEDURE — 36415 COLL VENOUS BLD VENIPUNCTURE: CPT | Mod: ZL

## 2024-06-24 RX ORDER — PREDNISONE 20 MG/1
40 TABLET ORAL DAILY
Qty: 10 TABLET | Refills: 0 | Status: SHIPPED | OUTPATIENT
Start: 2024-06-24 | End: 2024-06-29

## 2024-06-24 ASSESSMENT — PAIN SCALES - GENERAL: PAINLEVEL: MODERATE PAIN (4)
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
## 2024-06-24 NOTE — NURSING NOTE
"Chief Complaint   Patient presents with    Toe Pain     Left great toe    Musculoskeletal Problem     Left second toe     Tx with ibuprofen with some relief.  Not tx today    Initial BP (!) 134/96   Pulse 72   Temp 97.1  F (36.2  C) (Tympanic)   Resp 16   Ht 1.88 m (6' 2\")   Wt 100.2 kg (221 lb)   SpO2 98%   BMI 28.37 kg/m   Estimated body mass index is 28.37 kg/m  as calculated from the following:    Height as of this encounter: 1.88 m (6' 2\").    Weight as of this encounter: 100.2 kg (221 lb).     Advance Care Directive on file? no    FOOD SECURITY SCREENING QUESTIONS:    The next two questions are to help us understand your food security.  If you are feeling you need any assistance in this area, we have resources available to support you today.    Hunger Vital Signs:  Within the past 12 months we worried whether our food would run out before we got money to buy more. Never  Within the past 12 months the food we bought just didn't last and we didn't have money to get more. Never  Ana Ortiz LPN,STEVE on 6/24/2024 at 10:45 AM      Ana Ortiz LPN     "

## 2024-09-19 DIAGNOSIS — I10 ESSENTIAL HYPERTENSION: ICD-10-CM

## 2024-09-20 RX ORDER — LISINOPRIL 20 MG/1
20 TABLET ORAL DAILY
Qty: 90 TABLET | Refills: 4 | Status: SHIPPED | OUTPATIENT
Start: 2024-09-20

## 2024-09-20 RX ORDER — HYDROCHLOROTHIAZIDE 25 MG/1
TABLET ORAL
Qty: 90 TABLET | Refills: 4 | Status: SHIPPED | OUTPATIENT
Start: 2024-09-20

## 2024-09-20 NOTE — TELEPHONE ENCOUNTER
[id number] ([facility]  sent Rx request for the following:      Requested Prescriptions   Pending Prescriptions Disp Refills    hydrochlorothiazide (HYDRODIURIL) 25 MG tablet [Pharmacy Med Name: HYDROCHLOROTHIAZIDE 25MG TAB] 90 tablet 4     Sig: TAKE 1 TABLET BY MOUTH ONCE DAILY       Diuretics (Including Combos) Protocol Failed - 9/19/2024  9:51 AM        Failed - Blood pressure under 140/90 in past 12 months     BP Readings from Last 3 Encounters:   06/24/24 (!) 134/96   09/18/23 102/80   08/31/21 130/86   No data recorded     Last Prescription Date:   09/18/23  Last Fill Qty/Refills:         90, R-4       lisinopril (ZESTRIL) 20 MG tablet [Pharmacy Med Name: LISINOPRIL 20MG TABLET] 90 tablet 4     Sig: TAKE 1 TABLET (20 MG) BY MOUTH DAILY       ACE Inhibitors (Including Combos) Protocol Failed - 9/19/2024  9:51 AM        Failed - Blood pressure under 140/90 in past 12 months- Clinicial or Patient Reported     BP Readings from Last 3 Encounters:   06/24/24 (!) 134/96   09/18/23 102/80   08/31/21 130/86   No data recorded       Last Prescription Date:   09/18/2023  Last Fill Qty/Refills:         90, R-4  Last Office Visit:              09/18/203   Future Office visit:           None     Routing to scheduling to call and assist pt with scheduling yearly exam.      Evette Soni RN on 9/20/2024 at 10:26 AM

## 2024-11-30 ENCOUNTER — HEALTH MAINTENANCE LETTER (OUTPATIENT)
Age: 49
End: 2024-11-30

## 2025-05-20 ENCOUNTER — HOSPITAL ENCOUNTER (OUTPATIENT)
Dept: GENERAL RADIOLOGY | Facility: OTHER | Age: 50
Discharge: HOME | End: 2025-05-20
Attending: NURSE PRACTITIONER
Payer: COMMERCIAL

## 2025-05-20 ENCOUNTER — OFFICE VISIT (OUTPATIENT)
Dept: FAMILY MEDICINE | Facility: OTHER | Age: 50
End: 2025-05-20
Payer: COMMERCIAL

## 2025-05-20 VITALS
OXYGEN SATURATION: 98 % | WEIGHT: 227 LBS | BODY MASS INDEX: 29.13 KG/M2 | SYSTOLIC BLOOD PRESSURE: 130 MMHG | TEMPERATURE: 97.2 F | DIASTOLIC BLOOD PRESSURE: 80 MMHG | HEART RATE: 66 BPM | HEIGHT: 74 IN | RESPIRATION RATE: 20 BRPM

## 2025-05-20 DIAGNOSIS — J20.9 ACUTE BRONCHITIS WITH SYMPTOMS > 10 DAYS: Primary | ICD-10-CM

## 2025-05-20 DIAGNOSIS — R05.8 RESPIRATORY TRACT CONGESTION WITH COUGH: ICD-10-CM

## 2025-05-20 PROCEDURE — 71046 X-RAY EXAM CHEST 2 VIEWS: CPT | Mod: 26 | Performed by: RADIOLOGY

## 2025-05-20 PROCEDURE — 71046 X-RAY EXAM CHEST 2 VIEWS: CPT

## 2025-05-20 RX ORDER — PREDNISONE 20 MG/1
20 TABLET ORAL EVERY MORNING
Qty: 3 TABLET | Refills: 0 | Status: SHIPPED | OUTPATIENT
Start: 2025-05-21 | End: 2025-05-24

## 2025-05-20 RX ORDER — AZITHROMYCIN 250 MG/1
TABLET, FILM COATED ORAL
Qty: 6 TABLET | Refills: 0 | Status: SHIPPED | OUTPATIENT
Start: 2025-05-20 | End: 2025-05-25

## 2025-05-20 RX ORDER — BENZONATATE 200 MG/1
200 CAPSULE ORAL 3 TIMES DAILY PRN
Qty: 30 CAPSULE | Refills: 0 | Status: SHIPPED | OUTPATIENT
Start: 2025-05-20

## 2025-05-20 ASSESSMENT — PAIN SCALES - GENERAL: PAINLEVEL_OUTOF10: NO PAIN (0)
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
## 2025-05-20 NOTE — NURSING NOTE
"Chief Complaint   Patient presents with    Cough     X2.5 weeks     Patient here for cough x2.5 weeks. Feels run down.     Initial /80   Pulse 66   Temp 97.2  F (36.2  C) (Tympanic)   Resp 20   Ht 1.88 m (6' 2\")   Wt 103 kg (227 lb)   SpO2 98%   BMI 29.15 kg/m   Estimated body mass index is 29.15 kg/m  as calculated from the following:    Height as of this encounter: 1.88 m (6' 2\").    Weight as of this encounter: 103 kg (227 lb).  Medication Review: complete    The next two questions are to help us understand your food security.  If you are feeling you need any assistance in this area, we have resources available to support you today.          5/20/2025   SDOH- Food Insecurity   Within the past 12 months, did you worry that your food would run out before you got money to buy more? N   Within the past 12 months, did the food you bought just not last and you didn t have money to get more? N         Health Care Directive:  Patient does not have a Health Care Directive: Discussed advance care planning with patient; however, patient declined at this time.    Shanna Whitlock LPN      "